# Patient Record
Sex: MALE | Employment: OTHER | URBAN - METROPOLITAN AREA
[De-identification: names, ages, dates, MRNs, and addresses within clinical notes are randomized per-mention and may not be internally consistent; named-entity substitution may affect disease eponyms.]

---

## 2022-01-08 ENCOUNTER — APPOINTMENT (OUTPATIENT)
Dept: CT IMAGING | Facility: CLINIC | Age: 37
End: 2022-01-08
Attending: EMERGENCY MEDICINE
Payer: MEDICAID

## 2022-01-08 ENCOUNTER — HOSPITAL ENCOUNTER (INPATIENT)
Facility: CLINIC | Age: 37
LOS: 3 days | Discharge: HOME | End: 2022-01-11
Attending: EMERGENCY MEDICINE | Admitting: INTERNAL MEDICINE
Payer: MEDICAID

## 2022-01-08 DIAGNOSIS — J90 PLEURAL EFFUSION: ICD-10-CM

## 2022-01-08 DIAGNOSIS — A41.9 SEPSIS, DUE TO UNSPECIFIED ORGANISM, UNSPECIFIED WHETHER ACUTE ORGAN DYSFUNCTION PRESENT (H): ICD-10-CM

## 2022-01-08 DIAGNOSIS — J18.9 PNEUMONIA OF LEFT LOWER LOBE DUE TO INFECTIOUS ORGANISM: ICD-10-CM

## 2022-01-08 DIAGNOSIS — F10.920 ALCOHOL INTOXICATION, UNCOMPLICATED (H): ICD-10-CM

## 2022-01-08 LAB
ALBUMIN SERPL-MCNC: 3.5 G/DL (ref 3.4–5)
ALP SERPL-CCNC: 134 U/L (ref 40–150)
ALT SERPL W P-5'-P-CCNC: 61 U/L (ref 0–70)
ANION GAP SERPL CALCULATED.3IONS-SCNC: 10 MMOL/L (ref 3–14)
AST SERPL W P-5'-P-CCNC: 26 U/L (ref 0–45)
BILIRUB SERPL-MCNC: 0.3 MG/DL (ref 0.2–1.3)
BUN SERPL-MCNC: 15 MG/DL (ref 7–30)
CALCIUM SERPL-MCNC: 8.8 MG/DL (ref 8.5–10.1)
CHLORIDE BLD-SCNC: 105 MMOL/L (ref 94–109)
CO2 SERPL-SCNC: 24 MMOL/L (ref 20–32)
CREAT SERPL-MCNC: 1.11 MG/DL (ref 0.66–1.25)
CRP SERPL-MCNC: 56.7 MG/L (ref 0–8)
ERYTHROCYTE [DISTWIDTH] IN BLOOD BY AUTOMATED COUNT: 11.4 % (ref 10–15)
ERYTHROCYTE [SEDIMENTATION RATE] IN BLOOD BY WESTERGREN METHOD: 46 MM/HR (ref 0–15)
ETHANOL SERPL-MCNC: 0.2 G/DL
GFR SERPL CREATININE-BSD FRML MDRD: 88 ML/MIN/1.73M2
GLUCOSE BLD-MCNC: 154 MG/DL (ref 70–99)
HCT VFR BLD AUTO: 42.8 % (ref 40–53)
HGB BLD-MCNC: 14.4 G/DL (ref 13.3–17.7)
LACTATE SERPL-SCNC: 2.5 MMOL/L (ref 0.7–2)
LIPASE SERPL-CCNC: 84 U/L (ref 73–393)
MAGNESIUM SERPL-MCNC: 2.6 MG/DL (ref 1.6–2.3)
MCH RBC QN AUTO: 32 PG (ref 26.5–33)
MCHC RBC AUTO-ENTMCNC: 33.6 G/DL (ref 31.5–36.5)
MCV RBC AUTO: 95 FL (ref 78–100)
PLATELET # BLD AUTO: 332 10E3/UL (ref 150–450)
POTASSIUM BLD-SCNC: 3.2 MMOL/L (ref 3.4–5.3)
PROCALCITONIN SERPL-MCNC: 0.35 NG/ML
PROT SERPL-MCNC: 8.6 G/DL (ref 6.8–8.8)
RBC # BLD AUTO: 4.5 10E6/UL (ref 4.4–5.9)
SODIUM SERPL-SCNC: 139 MMOL/L (ref 133–144)
TROPONIN I SERPL HS-MCNC: 3 NG/L
WBC # BLD AUTO: 18.1 10E3/UL (ref 4–11)

## 2022-01-08 PROCEDURE — 83690 ASSAY OF LIPASE: CPT | Performed by: EMERGENCY MEDICINE

## 2022-01-08 PROCEDURE — 84145 PROCALCITONIN (PCT): CPT | Performed by: EMERGENCY MEDICINE

## 2022-01-08 PROCEDURE — 120N000001 HC R&B MED SURG/OB

## 2022-01-08 PROCEDURE — 250N000009 HC RX 250: Performed by: EMERGENCY MEDICINE

## 2022-01-08 PROCEDURE — 84484 ASSAY OF TROPONIN QUANT: CPT | Performed by: EMERGENCY MEDICINE

## 2022-01-08 PROCEDURE — 83735 ASSAY OF MAGNESIUM: CPT | Performed by: EMERGENCY MEDICINE

## 2022-01-08 PROCEDURE — C9803 HOPD COVID-19 SPEC COLLECT: HCPCS

## 2022-01-08 PROCEDURE — 250N000011 HC RX IP 250 OP 636: Performed by: EMERGENCY MEDICINE

## 2022-01-08 PROCEDURE — 96365 THER/PROPH/DIAG IV INF INIT: CPT | Mod: 59

## 2022-01-08 PROCEDURE — 74177 CT ABD & PELVIS W/CONTRAST: CPT

## 2022-01-08 PROCEDURE — 85652 RBC SED RATE AUTOMATED: CPT | Performed by: EMERGENCY MEDICINE

## 2022-01-08 PROCEDURE — 250N000013 HC RX MED GY IP 250 OP 250 PS 637: Performed by: EMERGENCY MEDICINE

## 2022-01-08 PROCEDURE — 99291 CRITICAL CARE FIRST HOUR: CPT | Mod: 25

## 2022-01-08 PROCEDURE — 87636 SARSCOV2 & INF A&B AMP PRB: CPT | Performed by: EMERGENCY MEDICINE

## 2022-01-08 PROCEDURE — 87040 BLOOD CULTURE FOR BACTERIA: CPT | Performed by: EMERGENCY MEDICINE

## 2022-01-08 PROCEDURE — 93005 ELECTROCARDIOGRAM TRACING: CPT

## 2022-01-08 PROCEDURE — 96361 HYDRATE IV INFUSION ADD-ON: CPT

## 2022-01-08 PROCEDURE — 96375 TX/PRO/DX INJ NEW DRUG ADDON: CPT

## 2022-01-08 PROCEDURE — 36415 COLL VENOUS BLD VENIPUNCTURE: CPT | Performed by: EMERGENCY MEDICINE

## 2022-01-08 PROCEDURE — 85014 HEMATOCRIT: CPT | Performed by: EMERGENCY MEDICINE

## 2022-01-08 PROCEDURE — 82077 ASSAY SPEC XCP UR&BREATH IA: CPT | Performed by: EMERGENCY MEDICINE

## 2022-01-08 PROCEDURE — 80053 COMPREHEN METABOLIC PANEL: CPT | Performed by: EMERGENCY MEDICINE

## 2022-01-08 PROCEDURE — 83605 ASSAY OF LACTIC ACID: CPT | Performed by: EMERGENCY MEDICINE

## 2022-01-08 PROCEDURE — 258N000003 HC RX IP 258 OP 636: Performed by: EMERGENCY MEDICINE

## 2022-01-08 PROCEDURE — 86140 C-REACTIVE PROTEIN: CPT | Performed by: EMERGENCY MEDICINE

## 2022-01-08 RX ORDER — IOPAMIDOL 755 MG/ML
100 INJECTION, SOLUTION INTRAVASCULAR ONCE
Status: COMPLETED | OUTPATIENT
Start: 2022-01-08 | End: 2022-01-08

## 2022-01-08 RX ORDER — KETOROLAC TROMETHAMINE 15 MG/ML
15 INJECTION, SOLUTION INTRAMUSCULAR; INTRAVENOUS ONCE
Status: COMPLETED | OUTPATIENT
Start: 2022-01-08 | End: 2022-01-08

## 2022-01-08 RX ORDER — SODIUM CHLORIDE 9 MG/ML
INJECTION, SOLUTION INTRAVENOUS CONTINUOUS
Status: DISCONTINUED | OUTPATIENT
Start: 2022-01-08 | End: 2022-01-09

## 2022-01-08 RX ORDER — SODIUM CHLORIDE 9 MG/ML
INJECTION, SOLUTION INTRAVENOUS CONTINUOUS
Status: DISCONTINUED | OUTPATIENT
Start: 2022-01-09 | End: 2022-01-09

## 2022-01-08 RX ORDER — ACETAMINOPHEN 500 MG
1000 TABLET ORAL ONCE
Status: COMPLETED | OUTPATIENT
Start: 2022-01-08 | End: 2022-01-08

## 2022-01-08 RX ORDER — PIPERACILLIN SODIUM, TAZOBACTAM SODIUM 4; .5 G/20ML; G/20ML
4.5 INJECTION, POWDER, LYOPHILIZED, FOR SOLUTION INTRAVENOUS ONCE
Status: COMPLETED | OUTPATIENT
Start: 2022-01-08 | End: 2022-01-08

## 2022-01-08 RX ORDER — ONDANSETRON 2 MG/ML
4 INJECTION INTRAMUSCULAR; INTRAVENOUS EVERY 30 MIN PRN
Status: DISCONTINUED | OUTPATIENT
Start: 2022-01-08 | End: 2022-01-09

## 2022-01-08 RX ADMIN — KETOROLAC TROMETHAMINE 15 MG: 15 INJECTION, SOLUTION INTRAMUSCULAR; INTRAVENOUS at 22:29

## 2022-01-08 RX ADMIN — SODIUM CHLORIDE 69 ML: 900 INJECTION INTRAVENOUS at 22:34

## 2022-01-08 RX ADMIN — IOPAMIDOL 100 ML: 755 INJECTION, SOLUTION INTRAVENOUS at 22:34

## 2022-01-08 RX ADMIN — ONDANSETRON 4 MG: 2 INJECTION INTRAMUSCULAR; INTRAVENOUS at 22:29

## 2022-01-08 RX ADMIN — SODIUM CHLORIDE 1000 ML: 9 INJECTION, SOLUTION INTRAVENOUS at 23:22

## 2022-01-08 RX ADMIN — HYDROMORPHONE HYDROCHLORIDE 1 MG: 1 INJECTION, SOLUTION INTRAMUSCULAR; INTRAVENOUS; SUBCUTANEOUS at 22:29

## 2022-01-08 RX ADMIN — ACETAMINOPHEN 1000 MG: 500 TABLET, FILM COATED ORAL at 22:55

## 2022-01-08 RX ADMIN — SODIUM CHLORIDE 1000 ML: 9 INJECTION, SOLUTION INTRAVENOUS at 22:25

## 2022-01-08 RX ADMIN — PIPERACILLIN SODIUM AND TAZOBACTAM SODIUM 4.5 G: 4; .5 INJECTION, POWDER, LYOPHILIZED, FOR SOLUTION INTRAVENOUS at 23:24

## 2022-01-08 ASSESSMENT — MIFFLIN-ST. JEOR: SCORE: 1673.36

## 2022-01-09 ENCOUNTER — APPOINTMENT (OUTPATIENT)
Dept: CT IMAGING | Facility: CLINIC | Age: 37
End: 2022-01-09
Attending: NURSE PRACTITIONER
Payer: MEDICAID

## 2022-01-09 LAB
ALBUMIN SERPL-MCNC: 3 G/DL (ref 3.4–5)
ALBUMIN SERPL-MCNC: 3 G/DL (ref 3.4–5)
ALBUMIN UR-MCNC: 50 MG/DL
ALP SERPL-CCNC: 122 U/L (ref 40–150)
ALP SERPL-CCNC: 127 U/L (ref 40–150)
ALT SERPL W P-5'-P-CCNC: 46 U/L (ref 0–70)
ALT SERPL W P-5'-P-CCNC: 53 U/L (ref 0–70)
ANION GAP SERPL CALCULATED.3IONS-SCNC: 6 MMOL/L (ref 3–14)
ANION GAP SERPL CALCULATED.3IONS-SCNC: 6 MMOL/L (ref 3–14)
APPEARANCE UR: CLEAR
AST SERPL W P-5'-P-CCNC: 17 U/L (ref 0–45)
AST SERPL W P-5'-P-CCNC: 22 U/L (ref 0–45)
ATRIAL RATE - MUSE: 117 BPM
BASE EXCESS BLDV CALC-SCNC: -3.1 MMOL/L (ref -7.7–1.9)
BILIRUB SERPL-MCNC: 0.4 MG/DL (ref 0.2–1.3)
BILIRUB SERPL-MCNC: 0.8 MG/DL (ref 0.2–1.3)
BILIRUB UR QL STRIP: NEGATIVE
BUN SERPL-MCNC: 13 MG/DL (ref 7–30)
BUN SERPL-MCNC: 13 MG/DL (ref 7–30)
CALCIUM SERPL-MCNC: 8.4 MG/DL (ref 8.5–10.1)
CALCIUM SERPL-MCNC: 8.5 MG/DL (ref 8.5–10.1)
CHLORIDE BLD-SCNC: 102 MMOL/L (ref 94–109)
CHLORIDE BLD-SCNC: 111 MMOL/L (ref 94–109)
CO2 SERPL-SCNC: 21 MMOL/L (ref 20–32)
CO2 SERPL-SCNC: 22 MMOL/L (ref 20–32)
COLOR UR AUTO: YELLOW
CREAT SERPL-MCNC: 0.68 MG/DL (ref 0.66–1.25)
CREAT SERPL-MCNC: 0.76 MG/DL (ref 0.66–1.25)
CRP SERPL-MCNC: 122 MG/L (ref 0–8)
DIASTOLIC BLOOD PRESSURE - MUSE: NORMAL MMHG
ERYTHROCYTE [DISTWIDTH] IN BLOOD BY AUTOMATED COUNT: 11.4 % (ref 10–15)
ERYTHROCYTE [DISTWIDTH] IN BLOOD BY AUTOMATED COUNT: 11.4 % (ref 10–15)
FLUAV RNA SPEC QL NAA+PROBE: NEGATIVE
FLUBV RNA RESP QL NAA+PROBE: NEGATIVE
GFR SERPL CREATININE-BSD FRML MDRD: >90 ML/MIN/1.73M2
GFR SERPL CREATININE-BSD FRML MDRD: >90 ML/MIN/1.73M2
GLUCOSE BLD-MCNC: 147 MG/DL (ref 70–99)
GLUCOSE BLD-MCNC: 217 MG/DL (ref 70–99)
GLUCOSE UR STRIP-MCNC: >=1000 MG/DL
HCO3 BLDV-SCNC: 24 MMOL/L (ref 21–28)
HCT VFR BLD AUTO: 37.2 % (ref 40–53)
HCT VFR BLD AUTO: 37.9 % (ref 40–53)
HGB BLD-MCNC: 12.5 G/DL (ref 13.3–17.7)
HGB BLD-MCNC: 13.2 G/DL (ref 13.3–17.7)
HGB UR QL STRIP: NEGATIVE
INTERPRETATION ECG - MUSE: NORMAL
KETONES UR STRIP-MCNC: 40 MG/DL
LACTATE SERPL-SCNC: 1 MMOL/L (ref 0.7–2)
LACTATE SERPL-SCNC: 1.1 MMOL/L (ref 0.7–2)
LACTATE SERPL-SCNC: 1.9 MMOL/L (ref 0.7–2)
LEUKOCYTE ESTERASE UR QL STRIP: NEGATIVE
LIPASE SERPL-CCNC: 59 U/L (ref 73–393)
MAGNESIUM SERPL-MCNC: 2.3 MG/DL (ref 1.6–2.3)
MCH RBC QN AUTO: 32.1 PG (ref 26.5–33)
MCH RBC QN AUTO: 32.2 PG (ref 26.5–33)
MCHC RBC AUTO-ENTMCNC: 33.6 G/DL (ref 31.5–36.5)
MCHC RBC AUTO-ENTMCNC: 34.8 G/DL (ref 31.5–36.5)
MCV RBC AUTO: 92 FL (ref 78–100)
MCV RBC AUTO: 95 FL (ref 78–100)
MUCOUS THREADS #/AREA URNS LPF: PRESENT /LPF
NITRATE UR QL: NEGATIVE
O2/TOTAL GAS SETTING VFR VENT: 20 %
P AXIS - MUSE: 30 DEGREES
PCO2 BLDV: 48 MM HG (ref 40–50)
PH BLDV: 7.3 [PH] (ref 7.32–7.43)
PH UR STRIP: 6 [PH] (ref 5–7)
PLATELET # BLD AUTO: 231 10E3/UL (ref 150–450)
PLATELET # BLD AUTO: 238 10E3/UL (ref 150–450)
PO2 BLDV: 74 MM HG (ref 25–47)
POTASSIUM BLD-SCNC: 3.7 MMOL/L (ref 3.4–5.3)
POTASSIUM BLD-SCNC: 3.9 MMOL/L (ref 3.4–5.3)
POTASSIUM BLD-SCNC: 3.9 MMOL/L (ref 3.4–5.3)
PR INTERVAL - MUSE: 162 MS
PROCALCITONIN SERPL-MCNC: 0.85 NG/ML
PROT SERPL-MCNC: 7.7 G/DL (ref 6.8–8.8)
PROT SERPL-MCNC: 7.8 G/DL (ref 6.8–8.8)
QRS DURATION - MUSE: 86 MS
QT - MUSE: 318 MS
QTC - MUSE: 443 MS
R AXIS - MUSE: 55 DEGREES
RBC # BLD AUTO: 3.9 10E6/UL (ref 4.4–5.9)
RBC # BLD AUTO: 4.1 10E6/UL (ref 4.4–5.9)
RBC URINE: 0 /HPF
SARS-COV-2 RNA RESP QL NAA+PROBE: NEGATIVE
SODIUM SERPL-SCNC: 130 MMOL/L (ref 133–144)
SODIUM SERPL-SCNC: 138 MMOL/L (ref 133–144)
SP GR UR STRIP: 1.02 (ref 1–1.03)
SYSTOLIC BLOOD PRESSURE - MUSE: NORMAL MMHG
T AXIS - MUSE: -7 DEGREES
TROPONIN I SERPL HS-MCNC: 36 NG/L
UROBILINOGEN UR STRIP-MCNC: 2 MG/DL
VENTRICULAR RATE- MUSE: 117 BPM
WBC # BLD AUTO: 10.5 10E3/UL (ref 4–11)
WBC # BLD AUTO: 12.7 10E3/UL (ref 4–11)
WBC URINE: 2 /HPF

## 2022-01-09 PROCEDURE — 81001 URINALYSIS AUTO W/SCOPE: CPT | Performed by: NURSE PRACTITIONER

## 2022-01-09 PROCEDURE — 36415 COLL VENOUS BLD VENIPUNCTURE: CPT | Performed by: EMERGENCY MEDICINE

## 2022-01-09 PROCEDURE — 250N000011 HC RX IP 250 OP 636

## 2022-01-09 PROCEDURE — 84145 PROCALCITONIN (PCT): CPT | Performed by: NURSE PRACTITIONER

## 2022-01-09 PROCEDURE — 99222 1ST HOSP IP/OBS MODERATE 55: CPT | Mod: AI | Performed by: INTERNAL MEDICINE

## 2022-01-09 PROCEDURE — 250N000011 HC RX IP 250 OP 636: Performed by: INTERNAL MEDICINE

## 2022-01-09 PROCEDURE — 82803 BLOOD GASES ANY COMBINATION: CPT | Performed by: NURSE PRACTITIONER

## 2022-01-09 PROCEDURE — 87040 BLOOD CULTURE FOR BACTERIA: CPT | Performed by: NURSE PRACTITIONER

## 2022-01-09 PROCEDURE — 83605 ASSAY OF LACTIC ACID: CPT | Performed by: INTERNAL MEDICINE

## 2022-01-09 PROCEDURE — 84155 ASSAY OF PROTEIN SERUM: CPT | Performed by: NURSE PRACTITIONER

## 2022-01-09 PROCEDURE — 83735 ASSAY OF MAGNESIUM: CPT | Performed by: INTERNAL MEDICINE

## 2022-01-09 PROCEDURE — 258N000003 HC RX IP 258 OP 636: Performed by: NURSE PRACTITIONER

## 2022-01-09 PROCEDURE — 85027 COMPLETE CBC AUTOMATED: CPT | Performed by: INTERNAL MEDICINE

## 2022-01-09 PROCEDURE — 250N000009 HC RX 250: Performed by: INTERNAL MEDICINE

## 2022-01-09 PROCEDURE — 74177 CT ABD & PELVIS W/CONTRAST: CPT

## 2022-01-09 PROCEDURE — 86140 C-REACTIVE PROTEIN: CPT | Performed by: NURSE PRACTITIONER

## 2022-01-09 PROCEDURE — 36415 COLL VENOUS BLD VENIPUNCTURE: CPT | Performed by: INTERNAL MEDICINE

## 2022-01-09 PROCEDURE — 250N000011 HC RX IP 250 OP 636: Performed by: NURSE PRACTITIONER

## 2022-01-09 PROCEDURE — 120N000004 HC R&B MS OVERFLOW

## 2022-01-09 PROCEDURE — 93005 ELECTROCARDIOGRAM TRACING: CPT

## 2022-01-09 PROCEDURE — 36415 COLL VENOUS BLD VENIPUNCTURE: CPT | Performed by: NURSE PRACTITIONER

## 2022-01-09 PROCEDURE — 85027 COMPLETE CBC AUTOMATED: CPT | Performed by: NURSE PRACTITIONER

## 2022-01-09 PROCEDURE — 83605 ASSAY OF LACTIC ACID: CPT | Performed by: NURSE PRACTITIONER

## 2022-01-09 PROCEDURE — 84450 TRANSFERASE (AST) (SGOT): CPT | Performed by: NURSE PRACTITIONER

## 2022-01-09 PROCEDURE — 83690 ASSAY OF LIPASE: CPT | Performed by: NURSE PRACTITIONER

## 2022-01-09 PROCEDURE — 84132 ASSAY OF SERUM POTASSIUM: CPT | Performed by: INTERNAL MEDICINE

## 2022-01-09 PROCEDURE — 83605 ASSAY OF LACTIC ACID: CPT | Performed by: EMERGENCY MEDICINE

## 2022-01-09 PROCEDURE — 250N000013 HC RX MED GY IP 250 OP 250 PS 637: Performed by: INTERNAL MEDICINE

## 2022-01-09 PROCEDURE — 84484 ASSAY OF TROPONIN QUANT: CPT | Performed by: NURSE PRACTITIONER

## 2022-01-09 RX ORDER — OXYCODONE HYDROCHLORIDE 5 MG/1
5 TABLET ORAL EVERY 4 HOURS PRN
Status: DISCONTINUED | OUTPATIENT
Start: 2022-01-09 | End: 2022-01-11 | Stop reason: HOSPADM

## 2022-01-09 RX ORDER — ONDANSETRON 2 MG/ML
4 INJECTION INTRAMUSCULAR; INTRAVENOUS EVERY 6 HOURS PRN
Status: DISCONTINUED | OUTPATIENT
Start: 2022-01-09 | End: 2022-01-11 | Stop reason: HOSPADM

## 2022-01-09 RX ORDER — ACETAMINOPHEN 325 MG/1
325-650 TABLET ORAL EVERY 6 HOURS PRN
COMMUNITY

## 2022-01-09 RX ORDER — LANOLIN ALCOHOL/MO/W.PET/CERES
3 CREAM (GRAM) TOPICAL
Status: DISCONTINUED | OUTPATIENT
Start: 2022-01-09 | End: 2022-01-11 | Stop reason: HOSPADM

## 2022-01-09 RX ORDER — AMOXICILLIN 250 MG
2 CAPSULE ORAL 2 TIMES DAILY PRN
Status: DISCONTINUED | OUTPATIENT
Start: 2022-01-09 | End: 2022-01-11 | Stop reason: HOSPADM

## 2022-01-09 RX ORDER — ACETAMINOPHEN 650 MG/1
650 SUPPOSITORY RECTAL EVERY 6 HOURS PRN
Status: DISCONTINUED | OUTPATIENT
Start: 2022-01-09 | End: 2022-01-11 | Stop reason: HOSPADM

## 2022-01-09 RX ORDER — SODIUM CHLORIDE 9 MG/ML
INJECTION, SOLUTION INTRAVENOUS CONTINUOUS
Status: DISCONTINUED | OUTPATIENT
Start: 2022-01-09 | End: 2022-01-11 | Stop reason: HOSPADM

## 2022-01-09 RX ORDER — NALOXONE HYDROCHLORIDE 0.4 MG/ML
0.4 INJECTION, SOLUTION INTRAMUSCULAR; INTRAVENOUS; SUBCUTANEOUS
Status: DISCONTINUED | OUTPATIENT
Start: 2022-01-09 | End: 2022-01-11 | Stop reason: HOSPADM

## 2022-01-09 RX ORDER — LORAZEPAM 2 MG/ML
INJECTION INTRAMUSCULAR
Status: COMPLETED
Start: 2022-01-09 | End: 2022-01-09

## 2022-01-09 RX ORDER — CEFTRIAXONE 2 G/1
2 INJECTION, POWDER, FOR SOLUTION INTRAMUSCULAR; INTRAVENOUS EVERY 24 HOURS
Status: DISCONTINUED | OUTPATIENT
Start: 2022-01-09 | End: 2022-01-10

## 2022-01-09 RX ORDER — AMOXICILLIN 250 MG
1 CAPSULE ORAL 2 TIMES DAILY PRN
Status: DISCONTINUED | OUTPATIENT
Start: 2022-01-09 | End: 2022-01-11 | Stop reason: HOSPADM

## 2022-01-09 RX ORDER — HYDROMORPHONE HYDROCHLORIDE 1 MG/ML
.3-.5 INJECTION, SOLUTION INTRAMUSCULAR; INTRAVENOUS; SUBCUTANEOUS
Status: DISCONTINUED | OUTPATIENT
Start: 2022-01-09 | End: 2022-01-11 | Stop reason: HOSPADM

## 2022-01-09 RX ORDER — PROCHLORPERAZINE MALEATE 5 MG
10 TABLET ORAL EVERY 6 HOURS PRN
Status: DISCONTINUED | OUTPATIENT
Start: 2022-01-09 | End: 2022-01-11 | Stop reason: HOSPADM

## 2022-01-09 RX ORDER — NALOXONE HYDROCHLORIDE 0.4 MG/ML
0.2 INJECTION, SOLUTION INTRAMUSCULAR; INTRAVENOUS; SUBCUTANEOUS
Status: DISCONTINUED | OUTPATIENT
Start: 2022-01-09 | End: 2022-01-11 | Stop reason: HOSPADM

## 2022-01-09 RX ORDER — ACETAMINOPHEN 325 MG/1
650 TABLET ORAL EVERY 6 HOURS PRN
Status: DISCONTINUED | OUTPATIENT
Start: 2022-01-09 | End: 2022-01-11 | Stop reason: HOSPADM

## 2022-01-09 RX ORDER — HYDROMORPHONE HYDROCHLORIDE 1 MG/ML
INJECTION, SOLUTION INTRAMUSCULAR; INTRAVENOUS; SUBCUTANEOUS
Status: COMPLETED
Start: 2022-01-09 | End: 2022-01-09

## 2022-01-09 RX ORDER — POTASSIUM CHLORIDE 1500 MG/1
40 TABLET, EXTENDED RELEASE ORAL ONCE
Status: COMPLETED | OUTPATIENT
Start: 2022-01-09 | End: 2022-01-09

## 2022-01-09 RX ORDER — PIPERACILLIN SODIUM, TAZOBACTAM SODIUM 3; .375 G/15ML; G/15ML
3.38 INJECTION, POWDER, LYOPHILIZED, FOR SOLUTION INTRAVENOUS EVERY 6 HOURS
Status: DISCONTINUED | OUTPATIENT
Start: 2022-01-09 | End: 2022-01-11 | Stop reason: HOSPADM

## 2022-01-09 RX ORDER — PROCHLORPERAZINE 25 MG
25 SUPPOSITORY, RECTAL RECTAL EVERY 12 HOURS PRN
Status: DISCONTINUED | OUTPATIENT
Start: 2022-01-09 | End: 2022-01-11 | Stop reason: HOSPADM

## 2022-01-09 RX ORDER — IOPAMIDOL 755 MG/ML
95 INJECTION, SOLUTION INTRAVASCULAR ONCE
Status: COMPLETED | OUTPATIENT
Start: 2022-01-09 | End: 2022-01-09

## 2022-01-09 RX ORDER — LIDOCAINE 40 MG/G
CREAM TOPICAL
Status: DISCONTINUED | OUTPATIENT
Start: 2022-01-09 | End: 2022-01-11 | Stop reason: HOSPADM

## 2022-01-09 RX ORDER — IBUPROFEN 600 MG/1
600 TABLET, FILM COATED ORAL EVERY 6 HOURS PRN
Status: DISCONTINUED | OUTPATIENT
Start: 2022-01-09 | End: 2022-01-11 | Stop reason: HOSPADM

## 2022-01-09 RX ORDER — GUAIFENESIN/DEXTROMETHORPHAN 100-10MG/5
10 SYRUP ORAL EVERY 4 HOURS PRN
Status: DISCONTINUED | OUTPATIENT
Start: 2022-01-09 | End: 2022-01-11 | Stop reason: HOSPADM

## 2022-01-09 RX ORDER — AZITHROMYCIN 500 MG/1
500 INJECTION, POWDER, LYOPHILIZED, FOR SOLUTION INTRAVENOUS EVERY 24 HOURS
Status: COMPLETED | OUTPATIENT
Start: 2022-01-09 | End: 2022-01-09

## 2022-01-09 RX ORDER — ONDANSETRON 4 MG/1
4 TABLET, ORALLY DISINTEGRATING ORAL EVERY 6 HOURS PRN
Status: DISCONTINUED | OUTPATIENT
Start: 2022-01-09 | End: 2022-01-11 | Stop reason: HOSPADM

## 2022-01-09 RX ADMIN — CEFTRIAXONE SODIUM 2 G: 2 INJECTION, POWDER, FOR SOLUTION INTRAMUSCULAR; INTRAVENOUS at 03:52

## 2022-01-09 RX ADMIN — HYDROMORPHONE HYDROCHLORIDE 1 MG: 1 INJECTION, SOLUTION INTRAMUSCULAR; INTRAVENOUS; SUBCUTANEOUS at 16:02

## 2022-01-09 RX ADMIN — POTASSIUM CHLORIDE 40 MEQ: 1500 TABLET, EXTENDED RELEASE ORAL at 03:52

## 2022-01-09 RX ADMIN — AZITHROMYCIN MONOHYDRATE 500 MG: 500 INJECTION, POWDER, LYOPHILIZED, FOR SOLUTION INTRAVENOUS at 02:44

## 2022-01-09 RX ADMIN — OXYCODONE HYDROCHLORIDE 5 MG: 5 TABLET ORAL at 12:58

## 2022-01-09 RX ADMIN — HYDROMORPHONE HYDROCHLORIDE 2 MG: 1 INJECTION, SOLUTION INTRAMUSCULAR; INTRAVENOUS; SUBCUTANEOUS at 16:05

## 2022-01-09 RX ADMIN — SODIUM CHLORIDE: 9 INJECTION, SOLUTION INTRAVENOUS at 17:51

## 2022-01-09 RX ADMIN — IOPAMIDOL 95 ML: 755 INJECTION, SOLUTION INTRAVENOUS at 16:26

## 2022-01-09 RX ADMIN — LORAZEPAM 2 MG: 2 INJECTION INTRAMUSCULAR; INTRAVENOUS at 16:06

## 2022-01-09 RX ADMIN — IBUPROFEN 600 MG: 600 TABLET ORAL at 15:49

## 2022-01-09 RX ADMIN — PIPERACILLIN SODIUM AND TAZOBACTAM SODIUM 3.38 G: 3; .375 INJECTION, POWDER, LYOPHILIZED, FOR SOLUTION INTRAVENOUS at 17:52

## 2022-01-09 RX ADMIN — HYDROMORPHONE HYDROCHLORIDE 0.5 MG: 1 INJECTION, SOLUTION INTRAMUSCULAR; INTRAVENOUS; SUBCUTANEOUS at 10:13

## 2022-01-09 RX ADMIN — ACETAMINOPHEN 650 MG: 325 TABLET, FILM COATED ORAL at 06:21

## 2022-01-09 RX ADMIN — PIPERACILLIN SODIUM AND TAZOBACTAM SODIUM 3.38 G: 3; .375 INJECTION, POWDER, LYOPHILIZED, FOR SOLUTION INTRAVENOUS at 23:50

## 2022-01-09 RX ADMIN — HYDROMORPHONE HYDROCHLORIDE 0.5 MG: 1 INJECTION, SOLUTION INTRAMUSCULAR; INTRAVENOUS; SUBCUTANEOUS at 15:32

## 2022-01-09 RX ADMIN — SODIUM CHLORIDE 69 ML: 9 INJECTION, SOLUTION INTRAVENOUS at 16:28

## 2022-01-09 ASSESSMENT — ACTIVITIES OF DAILY LIVING (ADL)
ADLS_ACUITY_SCORE: 3
ADLS_ACUITY_SCORE: 7
ADLS_ACUITY_SCORE: 3
ADLS_ACUITY_SCORE: 7
ADLS_ACUITY_SCORE: 3

## 2022-01-09 ASSESSMENT — MIFFLIN-ST. JEOR: SCORE: 1686.06

## 2022-01-09 NOTE — ED NOTES
[facility]  ED Nurse Handoff Report    ED Chief complaint: Flank Pain and Nausea & Vomiting      ED Diagnosis:   Final diagnoses:   None       Code Status: Full Code    Allergies: No Known Allergies    Patient Story: Sudden onset of severe mid back pain at 20:30  Focused Assessment:  Alert and oriented x4. Hypoxic in room air, in 2L of O2. Independent at baseline. [place] citizen speaking only. 18G in R AC, 20G in L lower arm. Sinus tachycardic. No trauma noted or reported.     Labs Ordered and Resulted from Time of ED Arrival to Time of ED Departure   COMPREHENSIVE METABOLIC PANEL - Abnormal       Result Value    Sodium 139      Potassium 3.2 (*)     Chloride 105      Carbon Dioxide (CO2) 24      Anion Gap 10      Urea Nitrogen 15      Creatinine 1.11      Calcium 8.8      Glucose 154 (*)     Alkaline Phosphatase 134      AST 26      ALT 61      Protein Total 8.6      Albumin 3.5      Bilirubin Total 0.3      GFR Estimate 88     ETHYL ALCOHOL LEVEL - Abnormal    Alcohol ethyl 0.20 (*)    CBC WITH PLATELETS AND DIFFERENTIAL - Abnormal    WBC Count 18.1 (*)     RBC Count 4.50      Hemoglobin 14.4      Hematocrit 42.8      MCV 95      MCH 32.0      MCHC 33.6      RDW 11.4      Platelet Count 332     MAGNESIUM - Abnormal    Magnesium 2.6 (*)    CRP INFLAMMATION - Abnormal    CRP Inflammation 56.7 (*)    ERYTHROCYTE SEDIMENTATION RATE AUTO - Abnormal    Erythrocyte Sedimentation Rate 46 (*)    DIFFERENTIAL - Abnormal    % Neutrophils 45      % Lymphocytes 49      % Monocytes 4      % Eosinophils 2      % Basophils 0      Absolute Neutrophils 8.1      Absolute Lymphocytes 8.9 (*)     Absolute Monocytes 0.7      Absolute Eosinophils 0.4      Absolute Basophils 0.0      RBC Morphology Confirmed RBC Indices      Platelet Assessment        Value: Automated Count Confirmed. Platelet morphology is normal.    Reactive Lymphocytes Present (*)     Smudge Cells Present (*)     Pathologist Review Comments       LIPASE -  Normal    Lipase 84     TROPONIN I - Normal    Troponin I High Sensitivity 3     LACTIC ACID WHOLE BLOOD   ROUTINE UA WITH MICROSCOPIC REFLEX TO CULTURE   INFLUENZA A/B & SARS-COV2 PCR MULTIPLEX   PROCALCITONIN   BLOOD CULTURE   BLOOD CULTURE     CT Chest (PE) Abdomen Pelvis w Contrast   Preliminary Result   IMPRESSION:   1.  Left lower lobe and lingular pneumonia. There is a 2.6 cm ovoid nodule or nodular infiltrate in the left lower lobe. Follow-up recommended.   2.  Mildly enlarged mediastinal and left hilar lymph nodes.   3.  Small left pleural effusion.   4.  Fatty infiltration of the liver.   5.  Large amount of food debris in the stomach. No evidence of outlet obstruction.            Treatments and/or interventions provided: NS, pain meds  Patient's response to treatments and/or interventions: Tolerated well    To be done/followed up on inpatient unit:  Continue with plan of care per admitting MD.      Does this patient have any cognitive concerns?: N/A    Activity level - Baseline/Home:  Independent  Activity Level - Current:   Independent    Patient's Preferred language: Lao   Needed?: No    Isolation: None and COVID r/o and special precautions  Infection: Not Applicable  COVID r/o and special precautions  Patient tested for COVID 19 prior to admission: YES  Bariatric?: No    Vital Signs:   Vitals:    01/08/22 2250 01/08/22 2255 01/08/22 2300 01/08/22 2305   BP:   91/49    Pulse: (!) 121 117 116 117   Resp: 10 11 17 17   Temp:       TempSrc:       SpO2: 90% 96% 97% 97%   Weight:       Height:           Cardiac Rhythm:     Was the PSS-3 completed:   Yes  What interventions are required if any?               Family Comments: Wife came in with patient  OBS brochure/video discussed/provided to patient/family: N/A               For the majority of the shift this patient's behavior was Green.     ED NURSE PHONE NUMBER: [phone]

## 2022-01-09 NOTE — PHARMACY-ADMISSION MEDICATION HISTORY
Pharmacy Medication History  Admission medication history interview status for the 1/8/2022  admission is complete. See EPIC admission navigator for prior to admission medications     Location of Interview: Phone  Medication history sources: Patient    Significant changes made to the medication list:  Added entire list    In the past week, patient estimated taking medication this percent of the time: greater than 90%    Additional medication history information:   Pt doesn't know name of the pharmacy or the clinic, other than it is in [place]. He doesn't have his medications here and his wife doesn't speak English. He was able to tell me the dose of metformin (1000mg bid) but doesn't know the name of the cream that he uses but thinks it is some sort of corticosteroid. There are no records in Surescripts or Care Everywhere.    Medication reconciliation completed by provider prior to medication history? No    Time spent in this activity: 15 minutes    Prior to Admission medications    Medication Sig Last Dose Taking? Auth Provider   acetaminophen (TYLENOL) 325 MG tablet Take 325-650 mg by mouth every 6 hours as needed for mild pain 1/8/2022 at Unknown time Yes Unknown, Entered By History   metFORMIN (GLUCOPHAGE) 1000 MG tablet Take 1,000 mg by mouth 2 times daily (with meals) 1/8/2022 at Unknown time Yes Unknown, Entered By History   UNKNOWN TO PATIENT Apply topically 2 times daily To whole body except face 1/8/2022 at Unknown time Yes Unknown, Entered By History       The information provided in this note is only as accurate as the sources available at the time of update(s)

## 2022-01-09 NOTE — PROGRESS NOTES
Patient admitted today morning by my colleague for pneumonia and multiple other medical problems, he is currently resting in room air, has pleuritic chest pain, will continue IV antibiotics today, will transition to p.o. and possibly discharge tomorrow if he continues to remain in room air.  I visited with the patient and spouse and explained plan of care

## 2022-01-09 NOTE — CODE/RAPID RESPONSE
RRT Note:  RRT called for severe pain. Upon my arrival patient pacing room, yelling, diaphoretic, endorsing very severe LUQ and left flank pain. He notes the pain comes and goes. He shares with me he does not routinely abuse alcohol but drank lots of alcohol yesterday in an effort to help the pain. CT PE study on 1/8/2022 with LLL pneumonia and lingular pneumonia, 2.6 cm ovoid nodule or nodular infiltration in the LLL. Fever 101.9.     Plan:  - given severity of pain repeat imaging   - repeat labs  - dilaudid 3- mg IV x 1, lorazepam 1- mg   - EKG     ADDENDUM: Note above indicates dilaudid 3- mg IV and lorazepam 1-mg IV were given during RRT on 1/9/2022; the correct amount is dilaudid 3- mg IV and lorazepam 2- mg IV were given during RRT on 1/9/2022.     CHRISTIANO Addison, CNP  Hospitalist Service, House Officer  [facility]     Text Page  Pager: [phone]

## 2022-01-09 NOTE — ED TRIAGE NOTES
"[facility]  ED Arrival Note    Arrives through triage. ABC's intact. A &O X4. . Pt arrives with c/o R mid back pain since 8:30 pm. Patient is screaming in pain, unable to lay still, and reporting \"I'm going to die.\" Noted to be hypoxic and tachycardic. Requires       Visitors during triage: Unknown      Triage Interventions: Direct rooming     Ambulatory: Yes    Meets Stroke Criteria?: No    Meets Trauma Criteria?: No        Directed to: Main ED    Pronouns: he/him    "

## 2022-01-09 NOTE — ED PROVIDER NOTES
"  History     Chief Complaint:  Left flank pain    HPI   Of note, a English speaking nurse was used to interpret per the patient's request.  Trevor Johnson is a 36 year old male who presents with left flank pain.  He states this started about 8 PM tonight, just a few hours prior to arrival.  He also complains of feeling short of breath.  He has some pain in the left side of his abdomen as well, but he denies having a cough or any urinary symptoms.  He also denies any chest pain.  He states that he had similar symptoms about a week ago that were present for about a day but not as severe.  He did not get evaluated at that time.    ROS:  Review of Systems   All other systems reviewed and are negative.         Allergies:  Allergies have not been reviewed     Medications:    No current outpatient medications on file.      Past Medical History:    No past medical history on file.  There is no problem list on file for this patient.       Past Surgical History:    No past surgical history on file.     Family History:    family history is not on file.    Social History:   Drinks alcohol.  PCP: No primary care provider on file.     Physical Exam     Patient Vitals for the past 24 hrs:   BP Temp Temp src Pulse Resp SpO2 Height Weight   01/08/22 2305 -- -- -- 117 17 97 % -- --   01/08/22 2300 91/49 -- -- 116 17 97 % -- --   01/08/22 2255 -- -- -- 117 11 96 % -- --   01/08/22 2250 -- -- -- (!) 121 10 90 % -- --   01/08/22 2247 -- (!) 101.5  F (38.6  C) Temporal -- -- -- 1.6 m (5' 3\") 84.8 kg (187 lb)   01/08/22 2226 -- -- -- -- 27 90 % -- --   01/08/22 2222 -- -- -- -- -- (!) 89 % -- --   01/08/22 2221 -- -- -- -- -- (!) 89 % -- --   01/08/22 2219 -- -- -- -- -- (!) 87 % -- --   01/08/22 2218 -- -- -- -- -- (!) 87 % -- --   01/08/22 6543 (!) 128/102 -- -- (!) 131 -- (!) 89 % -- --        Physical Exam  Nursing note and vitals reviewed.  Constitutional:  Oriented to person, place, and time. Cooperative.  Appears very " uncomfortable and smells of alcohol.  HENT:   Nose:    Nose normal.   Mouth/Throat:   Facemask in place.  Eyes:    Conjunctivae normal and EOM are normal.      Pupils are equal, round, and reactive to light.   Neck:    Trachea normal.   Cardiovascular:  Tachycardic rate, regular rhythm, normal heart sounds and normal pulses. No murmur heard.  Pulmonary/Chest:  Tachypneic with diminished breath sounds in the left base as well as some rales in the left base.   Abdominal:   Soft. Normal appearance and bowel sounds are normal.      There is diffuse tenderness to palpation with left CVA tenderness to palpation.      There is no rebound.   Musculoskeletal:  Extremities atraumatic x 4.   Lymphadenopathy:  No cervical adenopathy.   Neurological:   Alert and oriented to person, place, and time. Normal strength.      No cranial nerve deficit or sensory deficit. GCS eye subscore is 4. GCS verbal subscore is 5. GCS motor subscore is 6.   Skin:    Skin is intact. No rash noted.   Psychiatric:   Normal mood and affect.      Emergency Department Course   ECG:  ECG  ECG taken at 2257, ECG read at 2300  Rate 117 bpm. NE interval 162 ms. QRS duration 86 ms. QT/QTc 318/443 ms. P-R-T axes 30 55 -7.       Imaging:  CT Chest (PE) Abdomen Pelvis w Contrast   Final Result   IMPRESSION:   1.  Left lower lobe and lingular pneumonia. There is a 2.6 cm ovoid nodule or nodular infiltrate in the left lower lobe. Follow-up recommended.   2.  Mildly enlarged mediastinal and left hilar lymph nodes.   3.  Small left pleural effusion.   4.  Fatty infiltration of the liver.   5.  Large amount of food debris in the stomach. No evidence of outlet obstruction.         Report per radiology    Laboratory:  Labs Ordered and Resulted from Time of ED Arrival to Time of ED Departure   COMPREHENSIVE METABOLIC PANEL - Abnormal       Result Value    Sodium 139      Potassium 3.2 (*)     Chloride 105      Carbon Dioxide (CO2) 24      Anion Gap 10      Urea  Nitrogen 15      Creatinine 1.11      Calcium 8.8      Glucose 154 (*)     Alkaline Phosphatase 134      AST 26      ALT 61      Protein Total 8.6      Albumin 3.5      Bilirubin Total 0.3      GFR Estimate 88     ETHYL ALCOHOL LEVEL - Abnormal    Alcohol ethyl 0.20 (*)    CBC WITH PLATELETS AND DIFFERENTIAL - Abnormal    WBC Count 18.1 (*)     RBC Count 4.50      Hemoglobin 14.4      Hematocrit 42.8      MCV 95      MCH 32.0      MCHC 33.6      RDW 11.4      Platelet Count 332     LACTIC ACID WHOLE BLOOD - Abnormal    Lactic Acid 2.5 (*)    MAGNESIUM - Abnormal    Magnesium 2.6 (*)    CRP INFLAMMATION - Abnormal    CRP Inflammation 56.7 (*)    ERYTHROCYTE SEDIMENTATION RATE AUTO - Abnormal    Erythrocyte Sedimentation Rate 46 (*)    PROCALCITONIN - Abnormal    Procalcitonin 0.35 (*)    DIFFERENTIAL - Abnormal    % Neutrophils 45      % Lymphocytes 49      % Monocytes 4      % Eosinophils 2      % Basophils 0      Absolute Neutrophils 8.1      Absolute Lymphocytes 8.9 (*)     Absolute Monocytes 0.7      Absolute Eosinophils 0.4      Absolute Basophils 0.0      RBC Morphology Confirmed RBC Indices      Platelet Assessment        Value: Automated Count Confirmed. Platelet morphology is normal.    Reactive Lymphocytes Present (*)     Smudge Cells Present (*)     Pathologist Review Comments       LIPASE - Normal    Lipase 84     TROPONIN I - Normal    Troponin I High Sensitivity 3     INFLUENZA A/B & SARS-COV2 PCR MULTIPLEX - Normal    Influenza A PCR Negative      Influenza B PCR Negative      SARS CoV2 PCR Negative     ROUTINE UA WITH MICROSCOPIC REFLEX TO CULTURE   BLOOD CULTURE   BLOOD CULTURE        Emergency Department Course:  Reviewed:  I reviewed nursing notes, vitals, past medical history, Care Everywhere and [facility]      Interventions:  Medications   0.9% sodium chloride BOLUS (0 mLs Intravenous Stopped 1/8/22 4713)     Followed by   sodium chloride 0.9% infusion (has no administration in time range)  "  ondansetron (ZOFRAN) injection 4 mg (4 mg Intravenous Given 1/8/22 2229)   0.9% sodium chloride BOLUS (0 mLs Intravenous Stopped 1/9/22 0001)     Followed by   sodium chloride 0.9% infusion (has no administration in time range)   ketorolac (TORADOL) injection 15 mg (15 mg Intravenous Given 1/8/22 2229)   HYDROmorphone (DILAUDID) injection 1 mg (1 mg Intravenous Given 1/8/22 2229)   Saline (69 mLs As instructed Given 1/8/22 2234)   iopamidol (ISOVUE-370) solution 100 mL (100 mLs Intravenous Given 1/8/22 2234)   acetaminophen (TYLENOL) tablet 1,000 mg (1,000 mg Oral Given 1/8/22 2255)   piperacillin-tazobactam (ZOSYN) 4.5 g vial to attach to  mL bag (0 g Intravenous Stopped 1/8/22 2354)        Disposition:  The patient was admitted to the hospital under the care of Dr. Reza.     Impression & Plan    CMS Diagnoses:   The patient has signs of Severe Sepsis    The patient has signs of Severe Sepsis as evidenced by:    1. 2 SIRS criteria, AND  2. Suspected infection, AND   3. Organ dysfunction: Lactic Acidosis with value >2.0    Time severe sepsis diagnosis confirmed: 2300 01/08/22 as this was the time when Lactate resulted, and the level was > 2.0    3 Hour Severe Sepsis Bundle Completion:    1. Initial Lactic Acid Result:   Recent Labs   Lab Test 01/08/22  2257   LACT 2.5*     2. Blood Cultures before Antibiotics: Yes  3. Broad Spectrum Antibiotics Administered:  yes       Anti-infectives (From admission through now)    Start     Dose/Rate Route Frequency Ordered Stop    01/08/22 2320  piperacillin-tazobactam (ZOSYN) 4.5 g vial to attach to  mL bag        Note to Pharmacy: For [facility], [facility] and [facility]: For Zosyn-naive patients, use the \"Zosyn initial dose + extended infusion\" order panel.    4.5 g  over 30 Minutes Intravenous ONCE 01/08/22 2318 01/08/22 2354          4. Fluid volume administered in ED:  Full 30 mL/kg bolus given (see amount below).    BMI Readings from Last 1 Encounters:   01/08/22 33.13 kg/m  "     30 mL/kg fluids based on weight: 2,540 mL  30 mL/kg fluids based on IBW (must be >= 60 inches tall): 1,710 mL                Severe Sepsis reassessment:  1. Repeat Lactic Acid Level: pending    I attest to having performed a repeat sepsis exam and assessment of perfusion at 2320 and the results demonstrate improved perfusion.     Covid-19  Trevor Johnson was evaluated during a global COVID-19 pandemic, which necessitated consideration that the patient might be at risk for infection with the SARS-CoV-2 virus that causes COVID-19.   Applicable protocols for evaluation were followed during the patient's care.   COVID-19 was considered as part of the patient's evaluation. The plan for testing is:  a test was obtained during this visit.     Medical Decision Making:  This is a 36-year-old male who came in for further evaluation of acute onset of left flank pain as well as shortness of breath.  Based on his presentation, I was initially suspicious he would have a kidney stone.  However he was also hypoxic, which made me concerned about the possibility of pulmonary embolism, pneumonia, or possibly a pneumothorax.  I also considered the possibility of a kidney infection or sepsis.  I proceeded with the above work-up including the blood work and CT scan.  He was provided the above interventions as well with significant improvement of his symptoms.  He did require oxygen by nasal cannula to get his oxygen saturation levels up as well.  It appears that he is septic from pneumonia.  He was provided IV fluids as well as IV Zosyn.  His Covid swab is negative.  He also is intoxicated, and I suspect that he may be an alcoholic.  Regardless, he clearly needs to be admitted to the hospital for further evaluation and management.  I subsequently spoke with Dr. Reza, who will be admitting him.  A repeat lactic acid level is pending.    Diagnosis:    ICD-10-CM    1. Pneumonia of left lower lobe due to infectious organism  J18.9     2. Pleural effusion  J90    3. Sepsis, due to unspecified organism, unspecified whether acute organ dysfunction present (H)  A41.9    4. Alcohol intoxication, uncomplicated (H)  F10.920           1/8/2022   Kartik Lowery MD Lashkowitz, Seth H, MD  01/09/22 0018

## 2022-01-09 NOTE — PROGRESS NOTES
Pt is Croatian speaker. Pt is A&Ox4. VSS on RA Except temp of 101.1. Weaned from 2L to RA, sating 94-96%. Pt denies SOB, N/V. C/o of small pain to his R side when taking a deep breath PRN tylenol given with effect. Up independently. Tolerating regular diet. K: 3.2 and it was replaced, recheck pending. On IV azithromycin and rocephin. Discharge 1-2 days. Continue to monitor.

## 2022-01-09 NOTE — PROGRESS NOTES
RECEIVING UNIT ED HANDOFF REVIEW    ED Nurse Handoff Report was reviewed by: Duyen Degroot RN on January 9, 2022 at 12:15 AM

## 2022-01-09 NOTE — PROGRESS NOTES
PRIMARY DIAGNOSIS: PNEUMONIA  OUTPATIENT/OBSERVATION GOALS TO BE MET BEFORE DISCHARGE:  1. Dyspnea improved and O2 sats >88% on RA or back to baseline O2 levels: Yes   SpO2: 94 %, O2 Device: None (Room air)    2. Tolerating oral abx or appropriate plans made outpatient infusion: No. Continues on IV abx    3. Vitals signs normal or return to baseline: No    4. Short term supplemental O2 needed with activity at home: No    5. Tolerate oral intake to maintain hydration: Yes    6. Return to near baseline physical activity: No    Discharge Planner Nurse   Safe discharge environment identified: Yes  Barriers to discharge: Yes       Entered by: Seth Godinez 01/09/2022 9:38 AM     Please review provider order for any additional goals.   Nurse to notify provider when observation goals have been met and patient is ready for discharge.

## 2022-01-09 NOTE — H&P
[facility]    History and Physical - Hospitalist Service       Date of Admission:  1/8/2022    Assessment & Plan      Trevor Johnson is a 36 year old male admitted on 1/8/2022. He presents to the emergency department for evaluation of left posterior pleuritic chest pain, fevers, cough, and is found to have left lower lobe and lingular pneumonia with associated hypoxia and sepsis.    Community-acquired pneumonia:  Left-sided pleuritis: Parapneumonic pleurisy  Sepsis: Secondary to community-acquired pneumonia as above  Acute hypoxic respiratory failure: Patient with hypoxia, fevers, tachycardia, leukocytosis, lactic acid greater than 2, pleuritic chest discomfort and nonproductive cough.  COVID-19 and influenza negative.  CT chest imaging notable for left lower lobe and lingula infiltrate most consistent with bacterial pneumonia.  No prior history of pneumonia, no sick contacts.  Reports that he received his childhood vaccinations in [place].  No history of sexually transmitted illness.  Though hypoxic on room air, corrected with 2 L nasal cannula oxygen.  Some degree of hypoxia might have been secondary to pleuritic chest pain and splinting  -Trend fever curve and ensure improving  -Wean oxygen as able  -Switching from Zosyn administered in the emergency department to ceftriaxone and azithromycin for community-acquired pneumonia  -Incentive spirometry, pulmonary toilet.  Discussed with patient on admission  -Acetaminophen, ibuprofen for pain control primarily.  Anticipate most benefit from anti-inflammatory with ibuprofen.  Also has oxycodone and IV Dilaudid available if needed for breakthrough pain  -If fever curve fails to improve, consider thoracic surgery consultation in the setting of small left pleural effusion to rule out empyema.  At this juncture, patient reports he has only had 2 days of symptoms, so this seems less likely/necessary.    Lymphocytosis: Patient's presentation  is most consistent with acute infectious process, though CBC with lymphocytosis and smudge cells noted on differential.  Lymphocytic leukemia or lymphoma could potentially present similarly, though patient's age and history suggests against this diagnosis.  Note that infectious processes can be more common in the setting of leukemias and lymphomas, so diagnosis of pneumonia does not necessarily rule this out.  -Trend CBC; if lymphocytosis/smudge cells persists despite treatment of infectious process, would recommend peripheral smear evaluation  -Follow-up CT chest imaging in approximately 6 weeks to ensure resolution of left lower lobe finding with completion of anti-infectives    Psoriasis: Patient with multiple skin lesions as well as prior hypopigmentation from scarring on distal greater than proximal upper and lower extremities.  Follows with a clinic in [place] for this and is treated with topical creams  -Resume prior to admission topical steroid when reconciled by pharmacy    Type 2 diabetes: Patient tells me that he is on oral agents at home for this, he believes he is on Metformin.  -Medium dose sliding scale insulin available as needed for hyper glycemia  -For now, holding prior to admission Metformin given initially elevated lactic acid.  Anticipate resumption on discharge and following pharmacy reconciliation    Hypokalemia: Mild at 3.2  -Potassium replacement protocol in place       Diet:  Regular adult diet as tolerated  DVT Prophylaxis: Pneumatic compression devices  Hager Catheter: Not present  Central Lines: None  Code Status:  Full code.  Confirmed with patient on admission    Clinically Significant Risk Factors Present on Admission                # Obesity: last Body mass index is 33.13 kg/m .      Disposition Plan   Expected Discharge:  anticipated discharge 1/10/2022 versus 1/11/2022 pending improvement in fever curve as well as more time limiting correction of hypoxia with pain control,  pulmonary toilet, and treatment of community-acquired pneumonia with associated sepsis  Anticipated discharge location:  Awaiting care coordination huddle  Delays:           The patient's care was discussed with the Patient and Dr. Cho in the emergency department, bedside nursing staff.    Abner Reza MD  [facility]  Securely message with the Vocera Web Console (learn more here)  Text page via [facility] Paging/Directory        ______________________________________________________________________    Chief Complaint   Shortness of breath, fevers, pleuritic chest discomfort    History is obtained from patient, chart review, discussion with ER provider.  A professional telephone  was utilized initially during interview.  Later, bedside nurse also assisted with additional history as this was found to be more helpful by patient and staff.    History of Present Illness   Trevor Johnson is a 36 year old male who presents to the emergency department for evaluation of left-sided pleuritic chest discomfort, fevers, cough.  He is found to be hypoxic, and on CT imaging, with left lingular and lower lobe pneumonia as well as a small left-sided pleural effusion.    Patient immigrated from [place] approximately 6 years ago.  Received his childhood vaccinations in [place].  He works in a concrete cleaning business currently.    Patient has a history of psoriasis for which he follows in clinic in [place].  Uses topical creams for this.  He reports no significant change in his psoriasis recently.    He tells me that he has no prior history of pneumonia, no history of sexually transmitted infections.  No known allergies.    He has type 2 diabetes for which he is on oral medications, he believes he is on Metformin.  He does not take insulin.    For the past 2 days, patient has had fevers, nonproductive cough, and left chest discomfort with deep inspiration.  This pain is largely at the  base and lateral in reference to his chest wall.  He has been taking acetaminophen intermittently at home, and tells me that this is helped with both the fever and his discomfort.    Presented to the emergency department because this pain was unbearable, 7-10/10.  Received pain medications in the emergency department and tells me that he is very much improved.  He still has some mild discomfort with deep inspiration, though is much better in terms of discomfort.    Patient tells me that he has been eating and drinking normally, staying hydrated in the past several days.  No nausea, no vomiting, no diarrhea     No sputum production with cough    Review of Systems    The 10 point Review of Systems is negative other than noted in the HPI or here.  No nausea, vomiting, or diarrhea  Reports poor sleep over the past 2 days secondary to chest discomfort and fevers    Past Medical History    I have reviewed this patient's medical history and updated it with pertinent information if needed.   Psoriasis  Type 2 diabetes    Past Surgical History   I have reviewed this patient's surgical history and updated it with pertinent information if needed.  Patient reports no prior surgeries    Social History   I have reviewed this patient's social history and updated it with pertinent information if needed.  Social History     Tobacco Use     Smoking status:  Never smoker     Smokeless tobacco: No   Substance Use Topics     Alcohol use:  Yes.  No history of alcohol withdrawal.  Tells me that he will go through a 12 pack of beers in approximately 2 weeks     Drug use:  Reports no illicit substance use       Family History     Both mother and father are alive, have diabetes  No family history of stroke or heart attack that he is aware of    Prior to Admission Medications   Believes that he is on Metformin for his diabetes as well as a topical cream for his psoriasis     Allergies   No Known Allergies    Physical Exam   Vital Signs: Temp:  99.3  F (37.4  C) Temp src: Oral BP: 105/64 Pulse: 117   Resp: 15 SpO2: 97 % O2 Device: Nasal cannula Oxygen Delivery: 2 LPM  Weight: 187 lbs 0 oz    General Appearance: Generally robust appearing, though overweight, [place] American male laying on hospital bed.  He is mildly diaphoretic, does appear sick currently, though not toxic  Eyes: Mild periorbital edema bilaterally.  No scleral icterus or injection  HEENT: Atraumatic, normocephalic other than some periorbital edema  Respiratory: Crackles in the left base consistent with CT findings.  Mild tachypnea.  No significant splinting at this time with deep inspiration, though pain medications administered in the emergency department were quite palliative for him.  Cardiovascular: Tachycardia to the 110 range.  2/6 systolic murmur appreciated at apex  GI: Abdomen obese, soft, nontender to palpation.  No palpable mass.  Lymph/Hematologic: No lower extremity edema  Genitourinary: Not examined  Skin: Areas of circumferential hypopigmentation from prior scarring, active areas of psoriasis distally greater than proximally on both legs and arms.  Areas measure approximately 1 cm diameter  Musculoskeletal: Muscular tone and bulk intact in all extremities and appropriate for age  Neurologic: Alert, conversant, appropriate conversation.  Mental status is grossly intact  Psychiatric: Very pleasant, normal affect    Data   Data reviewed today: I reviewed all medications, new labs and imaging results over the last 24 hours. I personally reviewed the chest CT image(s) showing Left-sided lobar pneumonia.    Recent Labs   Lab 01/08/22  2230   WBC 18.1*   HGB 14.4   MCV 95         POTASSIUM 3.2*   CHLORIDE 105   CO2 24   BUN 15   CR 1.11   ANIONGAP 10   JOVI 8.8   *   ALBUMIN 3.5   PROTTOTAL 8.6   BILITOTAL 0.3   ALKPHOS 134   ALT 61   AST 26   LIPASE 84

## 2022-01-10 LAB
ERYTHROCYTE [DISTWIDTH] IN BLOOD BY AUTOMATED COUNT: 11.3 % (ref 10–15)
HCT VFR BLD AUTO: 37.2 % (ref 40–53)
HGB BLD-MCNC: 12.6 G/DL (ref 13.3–17.7)
LACTATE SERPL-SCNC: 0.9 MMOL/L (ref 0.7–2)
MCH RBC QN AUTO: 32 PG (ref 26.5–33)
MCHC RBC AUTO-ENTMCNC: 33.9 G/DL (ref 31.5–36.5)
MCV RBC AUTO: 94 FL (ref 78–100)
PLATELET # BLD AUTO: 203 10E3/UL (ref 150–450)
RBC # BLD AUTO: 3.94 10E6/UL (ref 4.4–5.9)
TROPONIN I SERPL HS-MCNC: 19 NG/L
TROPONIN I SERPL HS-MCNC: 20 NG/L
WBC # BLD AUTO: 11 10E3/UL (ref 4–11)

## 2022-01-10 PROCEDURE — 83605 ASSAY OF LACTIC ACID: CPT | Performed by: INTERNAL MEDICINE

## 2022-01-10 PROCEDURE — 36415 COLL VENOUS BLD VENIPUNCTURE: CPT | Performed by: INTERNAL MEDICINE

## 2022-01-10 PROCEDURE — 250N000011 HC RX IP 250 OP 636: Performed by: INTERNAL MEDICINE

## 2022-01-10 PROCEDURE — 250N000013 HC RX MED GY IP 250 OP 250 PS 637: Performed by: INTERNAL MEDICINE

## 2022-01-10 PROCEDURE — 36415 COLL VENOUS BLD VENIPUNCTURE: CPT | Performed by: NURSE PRACTITIONER

## 2022-01-10 PROCEDURE — 250N000011 HC RX IP 250 OP 636: Performed by: NURSE PRACTITIONER

## 2022-01-10 PROCEDURE — 99233 SBSQ HOSP IP/OBS HIGH 50: CPT | Performed by: INTERNAL MEDICINE

## 2022-01-10 PROCEDURE — 85027 COMPLETE CBC AUTOMATED: CPT | Performed by: INTERNAL MEDICINE

## 2022-01-10 PROCEDURE — 258N000003 HC RX IP 258 OP 636: Performed by: INTERNAL MEDICINE

## 2022-01-10 PROCEDURE — 258N000003 HC RX IP 258 OP 636: Performed by: NURSE PRACTITIONER

## 2022-01-10 PROCEDURE — 84484 ASSAY OF TROPONIN QUANT: CPT | Performed by: NURSE PRACTITIONER

## 2022-01-10 PROCEDURE — 120N000004 HC R&B MS OVERFLOW

## 2022-01-10 RX ORDER — KETOROLAC TROMETHAMINE 30 MG/ML
30 INJECTION, SOLUTION INTRAMUSCULAR; INTRAVENOUS ONCE
Status: COMPLETED | OUTPATIENT
Start: 2022-01-10 | End: 2022-01-10

## 2022-01-10 RX ADMIN — PIPERACILLIN SODIUM AND TAZOBACTAM SODIUM 3.38 G: 3; .375 INJECTION, POWDER, LYOPHILIZED, FOR SOLUTION INTRAVENOUS at 05:02

## 2022-01-10 RX ADMIN — OXYCODONE HYDROCHLORIDE 5 MG: 5 TABLET ORAL at 06:32

## 2022-01-10 RX ADMIN — OXYCODONE HYDROCHLORIDE 5 MG: 5 TABLET ORAL at 11:26

## 2022-01-10 RX ADMIN — AZITHROMYCIN MONOHYDRATE 250 MG: 500 INJECTION, POWDER, LYOPHILIZED, FOR SOLUTION INTRAVENOUS at 00:49

## 2022-01-10 RX ADMIN — ACETAMINOPHEN 650 MG: 325 TABLET, FILM COATED ORAL at 00:49

## 2022-01-10 RX ADMIN — PIPERACILLIN SODIUM AND TAZOBACTAM SODIUM 3.38 G: 3; .375 INJECTION, POWDER, LYOPHILIZED, FOR SOLUTION INTRAVENOUS at 11:25

## 2022-01-10 RX ADMIN — HYDROMORPHONE HYDROCHLORIDE 0.5 MG: 1 INJECTION, SOLUTION INTRAMUSCULAR; INTRAVENOUS; SUBCUTANEOUS at 10:26

## 2022-01-10 RX ADMIN — HYDROMORPHONE HYDROCHLORIDE 0.5 MG: 1 INJECTION, SOLUTION INTRAMUSCULAR; INTRAVENOUS; SUBCUTANEOUS at 17:00

## 2022-01-10 RX ADMIN — ENOXAPARIN SODIUM 40 MG: 40 INJECTION SUBCUTANEOUS at 14:43

## 2022-01-10 RX ADMIN — PIPERACILLIN SODIUM AND TAZOBACTAM SODIUM 3.38 G: 3; .375 INJECTION, POWDER, LYOPHILIZED, FOR SOLUTION INTRAVENOUS at 22:43

## 2022-01-10 RX ADMIN — SODIUM CHLORIDE: 9 INJECTION, SOLUTION INTRAVENOUS at 17:00

## 2022-01-10 RX ADMIN — SODIUM CHLORIDE: 9 INJECTION, SOLUTION INTRAVENOUS at 06:32

## 2022-01-10 RX ADMIN — OXYCODONE HYDROCHLORIDE 5 MG: 5 TABLET ORAL at 22:34

## 2022-01-10 RX ADMIN — ACETAMINOPHEN 650 MG: 325 TABLET, FILM COATED ORAL at 08:32

## 2022-01-10 RX ADMIN — PIPERACILLIN SODIUM AND TAZOBACTAM SODIUM 3.38 G: 3; .375 INJECTION, POWDER, LYOPHILIZED, FOR SOLUTION INTRAVENOUS at 17:02

## 2022-01-10 RX ADMIN — ACETAMINOPHEN 650 MG: 325 TABLET, FILM COATED ORAL at 20:51

## 2022-01-10 RX ADMIN — OXYCODONE HYDROCHLORIDE 5 MG: 5 TABLET ORAL at 00:01

## 2022-01-10 RX ADMIN — HYDROMORPHONE HYDROCHLORIDE 0.5 MG: 1 INJECTION, SOLUTION INTRAMUSCULAR; INTRAVENOUS; SUBCUTANEOUS at 06:57

## 2022-01-10 RX ADMIN — KETOROLAC TROMETHAMINE 30 MG: 30 INJECTION, SOLUTION INTRAMUSCULAR; INTRAVENOUS at 14:43

## 2022-01-10 RX ADMIN — HYDROMORPHONE HYDROCHLORIDE 0.5 MG: 1 INJECTION, SOLUTION INTRAMUSCULAR; INTRAVENOUS; SUBCUTANEOUS at 13:16

## 2022-01-10 ASSESSMENT — ACTIVITIES OF DAILY LIVING (ADL)
ADLS_ACUITY_SCORE: 5
ADLS_ACUITY_SCORE: 3
ADLS_ACUITY_SCORE: 5
ADLS_ACUITY_SCORE: 5
ADLS_ACUITY_SCORE: 3
ADLS_ACUITY_SCORE: 5
ADLS_ACUITY_SCORE: 3
ADLS_ACUITY_SCORE: 5
ADLS_ACUITY_SCORE: 3
ADLS_ACUITY_SCORE: 5
ADLS_ACUITY_SCORE: 3
ADLS_ACUITY_SCORE: 5
ADLS_ACUITY_SCORE: 5

## 2022-01-10 ASSESSMENT — MIFFLIN-ST. JEOR: SCORE: 1667.46

## 2022-01-10 NOTE — PROGRESS NOTES
[facility]    Hospitalist Progress Note    Assessment & Plan   Trevor Johnson is a 36 year old male admitted on 1/8/2022. He presents to the emergency department for evaluation of left posterior pleuritic chest pain, fevers, cough, and is found to have left lower lobe and lingular pneumonia with associated hypoxia and sepsis.     Community-acquired pneumonia:  Small left pleural effusion-parapneumonic  Left-sided pleuritis: Parapneumonic pleurisy  Sepsis: Secondary to community-acquired pneumonia as above  Acute hypoxic respiratory failure: Patient with hypoxia, fevers, tachycardia, leukocytosis, lactic acid greater than 2, pleuritic chest discomfort and nonproductive cough.  COVID-19 and influenza negative.  CT chest imaging notable for left lower lobe and lingula infiltrate most consistent with bacterial pneumonia.  No prior history of pneumonia, no sick contacts.  Reports that he received his childhood vaccinations in [place].  No history of sexually transmitted illness.  Though hypoxic on room air, corrected with 2 L nasal cannula oxygen.  Some degree of hypoxia might have been secondary to pleuritic chest pain and splinting  -Trend fever curve and ensure improving, patient is currently in room air  -Patient was switched to Zosyn and Zithromax oral 1/9 due to increased pain.  White count is normalized.  -Encourage I-S use  -Acetaminophen, ibuprofen for pain control primarily.  We will try a dose of IV Toradol since creatinine is stable, patient continues to have significant left-sided chest pain.  Repeat CT was obtained on 1/9 due to pain and had noted worsening of pneumonia.  Antibiotics changed since then.  Hyponatremia  --Continue IV fluids, appears to be hypovolemic  Lymphocytosis: Patient's presentation is most consistent with acute infectious process, though CBC with lymphocytosis and smudge cells noted on differential.  Lymphocytic leukemia or lymphoma could potentially  present similarly, though patient's age and history suggests against this diagnosis.  Note that infectious processes can be more common in the setting of leukemias and lymphomas, so diagnosis of pneumonia does not necessarily rule this out.  -White count has normalized  -Follow-up CT chest imaging in approximately 6 weeks to ensure resolution of left lower lobe finding with completion of anti-infectives     Psoriasis: Patient with multiple skin lesions as well as prior hypopigmentation from scarring on distal greater than proximal upper and lower extremities.  Follows with a clinic in [place] for this and is treated with topical creams  -Resume prior to admission topical steroid      Type 2 diabetes: Patient tells me that he is on oral agents at home for this, he believes he is on Metformin.  -Medium dose sliding scale insulin available as needed for hyper glycemia  -We will continue Metformin on discharge     Hypokalemia: Mild at 3.2  -Potassium replacement protocol in place    DVT Prophylaxis: Lovenox  Code Status: Full Code     Disposition: Expected discharge possibly 1/11/2022 pain is controlled    Iesha Kingston MD  Text Page   (7am to 6pm)    Interval History   Patient continues to have significant left-sided chest pain, mostly on lateral aspect, he is currently resting in room air, appears weak    -Data reviewed today: I reviewed all new labs and imaging results over the last 24 hours.   Physical Exam   Temp: 99.9  F (37.7  C) Temp src: Oral BP: (!) 142/99 Pulse: 114   Resp: 18 SpO2: 91 % O2 Device: None (Room air) Oxygen Delivery: 6 LPM  Vitals:    01/08/22 2247 01/09/22 0808 01/10/22 0616   Weight: 84.8 kg (187 lb) 86.1 kg (189 lb 12.8 oz) 84.2 kg (185 lb 11.2 oz)     Vital Signs with Ranges  Temp:  [97.8  F (36.6  C)-101.9  F (38.8  C)] 99.9  F (37.7  C)  Pulse:  [] 114  Resp:  [18-30] 18  BP: (111-151)/() 142/99  SpO2:  [91 %-96 %] 91 %  I/O last 3 completed shifts:  In: 720 [P.O.:720]  Out:  600 [Urine:600]    Constitutional: Awake, alert, cooperative, no apparent distress  Respiratory:crackles present on left lower lobe.  Cardiovascular: Regular rate and rhythm, normal S1 and S2, and no murmur noted  GI: Normal bowel sounds, soft, non-distended, non-tender  Skin/Integumen: No rashes, no cyanosis, no edema  Neuro : moving all 4 extremities, no focal deficit noted     Medications     sodium chloride 100 mL/hr at 01/10/22 0632       azithromycin  250 mg Intravenous Q24H     piperacillin-tazobactam  3.375 g Intravenous Q6H     sodium chloride (PF)  3 mL Intracatheter Q8H       Data   Recent Labs   Lab 01/10/22  0320 01/09/22  1649 01/09/22  0854 01/09/22  0706 01/08/22  2230   WBC 11.0 12.7* 10.5  --  18.1*   HGB 12.6* 12.5* 13.2*  --  14.4   MCV 94 95 92  --  95    238 231  --  332   NA  --  130* 138  --  139   POTASSIUM  --  3.7 3.9 3.9 3.2*   CHLORIDE  --  102 111*  --  105   CO2  --  22 21  --  24   BUN  --  13 13  --  15   CR  --  0.76 0.68  --  1.11   ANIONGAP  --  6 6  --  10   JOVI  --  8.4* 8.5  --  8.8   GLC  --  217* 147*  --  154*   ALBUMIN  --  3.0* 3.0*  --  3.5   PROTTOTAL  --  7.7 7.8  --  8.6   BILITOTAL  --  0.8 0.4  --  0.3   ALKPHOS  --  122 127  --  134   ALT  --  46 53  --  61   AST  --  17 22  --  26   LIPASE  --  59*  --   --  84     Recent Labs   Lab 01/09/22  1649 01/09/22  0854 01/08/22  2230   * 147* 154*       Imaging:   Recent Results (from the past 24 hour(s))   CT Chest/Abdomen/Pelvis w Contrast    Narrative    EXAM: CT CHEST/ABDOMEN/PELVIS W CONTRAST  LOCATION: [facility]  DATE/TIME: 1/9/2022 4:24 PM    INDICATION: CTA chest and CT abdomen and pelvis 01/08/2022.  COMPARISON: None.  TECHNIQUE: CT scan of the chest, abdomen, and pelvis was performed following injection of IV contrast. Multiplanar reformats were obtained. Dose reduction techniques were used.   CONTRAST: 95 mL [id number]    FINDINGS:   LUNGS AND PLEURA: Respiratory motion  artifact. Consolidation of the lingula and left lower lobe has worsened from yesterday. The small left pleural effusion has enlarged. Mild right basilar atelectasis. No pneumothorax.    MEDIASTINUM/AXILLAE: Small amount of fluid throughout the lumen of the esophagus suggests gastroesophageal reflux. Mildly enlarged left hilar and mediastinal lymph nodes.    CORONARY ARTERY CALCIFICATION: None.    HEPATOBILIARY: Hepatic steatosis. Normal gallbladder and bile ducts.    PANCREAS: Normal.    SPLEEN: Normal.    ADRENAL GLANDS: Normal.    KIDNEYS/BLADDER: Normal.    BOWEL: Normal. No obstruction or inflammation. Normal appendix. No free fluid or gas.    LYMPH NODES: Normal.    VASCULATURE: Unremarkable.    PELVIC ORGANS: Normal.    MUSCULOSKELETAL: Normal.      Impression    IMPRESSION:  1.  Pneumonia of the lingula and left lower lobe has worsened from yesterday.  2.  Small left pleural effusion has enlarged.  3.  Small amount of fluid in the lumen of the esophagus suggests gastroesophageal reflux.  4.  No acute process in the abdomen or pelvis.  5.  No urolithiasis or hydronephrosis.  6.  Hepatic steatosis.

## 2022-01-10 NOTE — PROGRESS NOTES
Guinean speaking,Jona utilized. Tmax 101.9, now 98.3, sinus Tachy,  slightly elevated BP,RA. RRT early on d/t uncontrolled left flank/ribcage pain,refer to RRT note. UA sent, refer to results. CT chest/abdomen/pelvis done, showing worsening  PNA, MD aware, continues on IV abx. Blood culture pending.SBA. Monitor.

## 2022-01-10 NOTE — PROGRESS NOTES
Pt is Tajik speaker. Pt is A&Ox4. VSS on RA. Pt denies SOB, N/V. C/o L sided ribcage pain managed with PRN oxy and Tylenol.Tele: NSR. Up SBA. Tolerating regular diet. On IV azithromycin and zosyn. CT chest/abdomen/pelvis showing worsening pheumonia from the first imaging.Discharge 1-2 days. Continue to monitor.

## 2022-01-10 NOTE — PROGRESS NOTES
[facility] RRT Followup Note    Pt is still somnolent, still has in nasal airway but has good respiratory rate and pattern, no snoring, HOB 45%.  On continuous pulse ox, and tele.  Wife at bedside.    When pt is more alert nasal airway may be removed, and if pt removes it himself it can stay out.

## 2022-01-11 ENCOUNTER — APPOINTMENT (OUTPATIENT)
Dept: INTERPRETER SERVICES | Facility: CLINIC | Age: 37
End: 2022-01-11
Payer: MEDICAID

## 2022-01-11 VITALS
WEIGHT: 185 LBS | SYSTOLIC BLOOD PRESSURE: 142 MMHG | BODY MASS INDEX: 32.78 KG/M2 | HEIGHT: 63 IN | HEART RATE: 102 BPM | OXYGEN SATURATION: 94 % | TEMPERATURE: 99 F | RESPIRATION RATE: 18 BRPM | DIASTOLIC BLOOD PRESSURE: 87 MMHG

## 2022-01-11 LAB
ANION GAP SERPL CALCULATED.3IONS-SCNC: 9 MMOL/L (ref 3–14)
ATRIAL RATE - MUSE: 133 BPM
BUN SERPL-MCNC: 7 MG/DL (ref 7–30)
CALCIUM SERPL-MCNC: 8.8 MG/DL (ref 8.5–10.1)
CHLORIDE BLD-SCNC: 105 MMOL/L (ref 94–109)
CO2 SERPL-SCNC: 22 MMOL/L (ref 20–32)
CREAT SERPL-MCNC: 0.66 MG/DL (ref 0.66–1.25)
DIASTOLIC BLOOD PRESSURE - MUSE: NORMAL MMHG
GFR SERPL CREATININE-BSD FRML MDRD: >90 ML/MIN/1.73M2
GLUCOSE BLD-MCNC: 144 MG/DL (ref 70–99)
INTERPRETATION ECG - MUSE: NORMAL
P AXIS - MUSE: 18 DEGREES
POTASSIUM BLD-SCNC: 3.4 MMOL/L (ref 3.4–5.3)
PR INTERVAL - MUSE: 142 MS
QRS DURATION - MUSE: 80 MS
QT - MUSE: 284 MS
QTC - MUSE: 422 MS
R AXIS - MUSE: 28 DEGREES
SODIUM SERPL-SCNC: 136 MMOL/L (ref 133–144)
SYSTOLIC BLOOD PRESSURE - MUSE: NORMAL MMHG
T AXIS - MUSE: 10 DEGREES
VENTRICULAR RATE- MUSE: 133 BPM

## 2022-01-11 PROCEDURE — 258N000003 HC RX IP 258 OP 636: Performed by: NURSE PRACTITIONER

## 2022-01-11 PROCEDURE — 80048 BASIC METABOLIC PNL TOTAL CA: CPT | Performed by: INTERNAL MEDICINE

## 2022-01-11 PROCEDURE — 250N000011 HC RX IP 250 OP 636: Performed by: NURSE PRACTITIONER

## 2022-01-11 PROCEDURE — 258N000003 HC RX IP 258 OP 636: Performed by: INTERNAL MEDICINE

## 2022-01-11 PROCEDURE — 250N000011 HC RX IP 250 OP 636: Performed by: INTERNAL MEDICINE

## 2022-01-11 PROCEDURE — 36415 COLL VENOUS BLD VENIPUNCTURE: CPT | Performed by: INTERNAL MEDICINE

## 2022-01-11 PROCEDURE — 99239 HOSP IP/OBS DSCHRG MGMT >30: CPT | Performed by: INTERNAL MEDICINE

## 2022-01-11 PROCEDURE — 250N000013 HC RX MED GY IP 250 OP 250 PS 637: Performed by: INTERNAL MEDICINE

## 2022-01-11 RX ORDER — AMOXICILLIN 250 MG
1 CAPSULE ORAL 2 TIMES DAILY PRN
COMMUNITY
Start: 2022-01-11

## 2022-01-11 RX ORDER — LORAZEPAM 2 MG/ML
2 INJECTION INTRAMUSCULAR ONCE
Status: DISCONTINUED | OUTPATIENT
Start: 2022-01-09 | End: 2022-01-11

## 2022-01-11 RX ORDER — PANTOPRAZOLE SODIUM 40 MG/1
40 TABLET, DELAYED RELEASE ORAL DAILY
Qty: 7 TABLET | Refills: 0 | Status: SHIPPED | OUTPATIENT
Start: 2022-01-11 | End: 2022-01-18

## 2022-01-11 RX ORDER — IBUPROFEN 600 MG/1
600 TABLET, FILM COATED ORAL EVERY 6 HOURS PRN
Qty: 12 TABLET | Refills: 0 | Status: SHIPPED | OUTPATIENT
Start: 2022-01-11 | End: 2022-01-14

## 2022-01-11 RX ORDER — OXYCODONE HYDROCHLORIDE 5 MG/1
5 TABLET ORAL EVERY 4 HOURS PRN
Qty: 10 TABLET | Refills: 0 | Status: SHIPPED | OUTPATIENT
Start: 2022-01-11

## 2022-01-11 RX ORDER — HYDROMORPHONE HYDROCHLORIDE 2 MG/ML
3 INJECTION, SOLUTION INTRAMUSCULAR; INTRAVENOUS; SUBCUTANEOUS ONCE
Status: DISCONTINUED | OUTPATIENT
Start: 2022-01-09 | End: 2022-01-11

## 2022-01-11 RX ORDER — ACETAMINOPHEN 325 MG/1
650 TABLET ORAL EVERY 6 HOURS PRN
COMMUNITY
Start: 2022-01-11

## 2022-01-11 RX ORDER — AZITHROMYCIN 250 MG/1
250 TABLET, FILM COATED ORAL DAILY
Qty: 3 TABLET | Refills: 0 | Status: SHIPPED | OUTPATIENT
Start: 2022-01-11 | End: 2022-01-14

## 2022-01-11 RX ADMIN — OXYCODONE HYDROCHLORIDE 5 MG: 5 TABLET ORAL at 08:47

## 2022-01-11 RX ADMIN — PIPERACILLIN SODIUM AND TAZOBACTAM SODIUM 3.38 G: 3; .375 INJECTION, POWDER, LYOPHILIZED, FOR SOLUTION INTRAVENOUS at 16:56

## 2022-01-11 RX ADMIN — ACETAMINOPHEN 650 MG: 325 TABLET, FILM COATED ORAL at 11:43

## 2022-01-11 RX ADMIN — SODIUM CHLORIDE: 9 INJECTION, SOLUTION INTRAVENOUS at 04:49

## 2022-01-11 RX ADMIN — OXYCODONE HYDROCHLORIDE 5 MG: 5 TABLET ORAL at 15:34

## 2022-01-11 RX ADMIN — PIPERACILLIN SODIUM AND TAZOBACTAM SODIUM 3.38 G: 3; .375 INJECTION, POWDER, LYOPHILIZED, FOR SOLUTION INTRAVENOUS at 11:43

## 2022-01-11 RX ADMIN — ACETAMINOPHEN 650 MG: 325 TABLET, FILM COATED ORAL at 04:46

## 2022-01-11 RX ADMIN — PIPERACILLIN SODIUM AND TAZOBACTAM SODIUM 3.38 G: 3; .375 INJECTION, POWDER, LYOPHILIZED, FOR SOLUTION INTRAVENOUS at 04:46

## 2022-01-11 RX ADMIN — AZITHROMYCIN MONOHYDRATE 250 MG: 500 INJECTION, POWDER, LYOPHILIZED, FOR SOLUTION INTRAVENOUS at 00:19

## 2022-01-11 ASSESSMENT — ACTIVITIES OF DAILY LIVING (ADL)
ADLS_ACUITY_SCORE: 3
DEPENDENT_IADLS:: INDEPENDENT
ADLS_ACUITY_SCORE: 3

## 2022-01-11 ASSESSMENT — MIFFLIN-ST. JEOR: SCORE: 1664.28

## 2022-01-11 NOTE — PLAN OF CARE
Inpatient. Icelandic speaking, able to understand english. Alert and oriented x4. VSS on room air ex tachycardic and htn d/t pain. Infusing  ml/hr. Tele is Sinus tachy. Independent. Regular diet. Tylenol, oxy and ice pack given for pain. Scheduled Zosyn and Zithromax given. Encouraged to use IS when awake, 1000 ml level and good in tolerance x10. Recommend follow up CT chest in 6 weeks. Plan to continue Metformin on discharge. Possible discharge today if pain is controlled.

## 2022-01-11 NOTE — PROGRESS NOTES
Afghan speaking,Jona utilized. Tmax 99.9, sinus Tachy, slightly elevated BP,RA. IV abx for PNA as ordered.Continues to c/o left flank/ribcage pain, oxycodone, dilaudid, tylenol given w/ some relief. MD ordered IV Toradol x1, given w/ some relief. Appetite is fair. SBA.

## 2022-01-11 NOTE — DISCHARGE SUMMARY
[facility]    Discharge Summary  Hospitalist    Date of Admission:  1/8/2022  Date of Discharge:  1/11/2022  Discharging Provider: Iesha Kingston MD    Discharge Diagnoses   Left lower lobe pneumonia  Sepsis  Alcohol intoxication  Pleural effusion    History of Present Illness   Review admission history and physical.    Hospital Course   Trevor Johnson was admitted on 1/8/2022.  The following problems were addressed during his hospitalization:    Active Problems:    Pleural effusion    Alcohol intoxication, uncomplicated (H)    Pneumonia of left lower lobe due to infectious organism    Sepsis, due to unspecified organism, unspecified whether acute organ dysfunction present (H)  Trevor Johnson is a 36 year old male admitted on 1/8/2022. He presents to the emergency department for evaluation of left posterior pleuritic chest pain, fevers, cough, and is found to have left lower lobe and lingular pneumonia with associated hypoxia and sepsis.     Community-acquired pneumonia:  Small left pleural effusion-parapneumonic  Left-sided pleuritis: Parapneumonic pleurisy  Sepsis: Secondary to community-acquired pneumonia as above  Acute hypoxic respiratory failure: Patient with hypoxia, fevers, tachycardia, leukocytosis, lactic acid greater than 2, pleuritic chest discomfort and nonproductive cough.  COVID-19 and influenza negative.  CT chest imaging notable for left lower lobe and lingula infiltrate most consistent with bacterial pneumonia.  No prior history of pneumonia, no sick contacts.  Reports that he received his childhood vaccinations in [place].  No history of sexually transmitted illness.  Though hypoxic on room air, corrected with 2 L nasal cannula oxygen.  Some degree of hypoxia might have been secondary to pleuritic chest pain and splinting. patient is currently in room air, afebrile  Patient was switched to Zosyn and Zithromax oral 1/9 due to increased pain.  White count is  normalized.  Encourage I-S use, patient had significant left-sided chest pain the day after admission, repeat CT was obtained on 1/9 due to pain and had noted worsening of pneumonia.  Antibiotics changed since then.  Patient will need repeat x-ray to evaluate resolution of symptoms, he was resting in room air and pain well controlled prior to discharge.  Hyponatremia  --Continue IV fluids, appears to be hypovolemic  Lymphocytosis: Patient's presentation is most consistent with acute infectious process, though CBC with lymphocytosis and smudge cells noted on differential.  Lymphocytic leukemia or lymphoma could potentially present similarly, though patient's age and history suggests against this diagnosis.  Note that infectious processes can be more common in the setting of leukemias and lymphomas, so diagnosis of pneumonia does not necessarily rule this out.  -White count has normalized  -Follow-up CT chest imaging in approximately 6 weeks to ensure resolution of left lower lobe finding with completion of anti-infectives     Psoriasis: Patient with multiple skin lesions as well as prior hypopigmentation from scarring on distal greater than proximal upper and lower extremities.  Follows with a clinic in [place] for this and is treated with topical creams  -Resume prior to admission topical steroid      Type 2 diabetes: Patient tells me that he is on oral agents at home for this, he believes he is on Metformin.  -Medium dose sliding scale insulin available as needed for hyper glycemia  -We will continue Metformin on discharge     Hypokalemia: Mild at 3.2  -Potassium replacement protocol in place      Iesha Kingston MD    Significant Results and Procedures       Pending Results   These results will be followed up by PCP  Unresulted Labs Ordered in the Past 30 Days of this Admission     Date and Time Order Name Status Description    1/9/2022  4:20 PM Blood Culture Arm, Right Preliminary     1/9/2022  4:20 PM Blood  Culture Arm, Left Preliminary     1/8/2022 10:48 PM Blood Culture Peripheral Blood Preliminary     1/8/2022 10:48 PM Blood Culture Peripheral Blood Preliminary           Code Status   Full Code       Primary Care Physician   Rocio Escobar    Physical Exam   Temp: 99.7  F (37.6  C) Temp src: Oral BP: (!) 135/96 Pulse: 115   Resp: 18 SpO2: 95 % O2 Device: None (Room air) Oxygen Delivery: 2 LPM  Vitals:    01/09/22 0808 01/10/22 0616 01/11/22 0700   Weight: 86.1 kg (189 lb 12.8 oz) 84.2 kg (185 lb 11.2 oz) 83.9 kg (185 lb)     Vital Signs with Ranges  Temp:  [98.3  F (36.8  C)-102.6  F (39.2  C)] 99.7  F (37.6  C)  Pulse:  [103-135] 115  Resp:  [18-24] 18  BP: (131-153)/() 135/96  SpO2:  [90 %-95 %] 95 %  I/O last 3 completed shifts:  In: 720 [P.O.:720]  Out: 750 [Urine:750]    The patient was examined on the day of discharge.    Discharge Disposition   Discharged to home  Condition at discharge: Stable    Consultations This Hospital Stay   CARE MANAGEMENT / SOCIAL WORK IP CONSULT    Time Spent on this Encounter   IIesha MD, personally saw the patient today and spent greater than 30 minutes discharging this patient.    Discharge Orders      Reason for your hospital stay    Left-sided pneumonia     Follow-up and recommended labs and tests     Follow up with primary care provider, Rocio Escobar, within 7 days for hospital follow- up.  The following labs/tests are recommended: Patient will need a repeat chest x-ray in 4weeks to evaluate the pneumonia, there is possibility of parapneumonic effusion and possible loculation.  Need to verify resolution.  CBC in 1 week with the differential.     Activity    Your activity upon discharge: activity as tolerated     Discharge Instructions    Abstain from drinking alcohol, please make sure you get a repeat chest x-ray in 4 weeks to evaluate resolution of the pneumonia.     Diet    Follow this diet upon discharge: Orders Placed This Encounter      Combination Diet  Regular Diet Adult     Discharge Medications   Current Discharge Medication List      START taking these medications    Details   !! acetaminophen (TYLENOL) 325 MG tablet Take 2 tablets (650 mg) by mouth every 6 hours as needed for mild pain or other (and adjunct with moderate or severe pain or per patient request)      amoxicillin-clavulanate (AUGMENTIN) 875-125 MG tablet Take 1 tablet by mouth 2 times daily for 10 days  Qty: 20 tablet, Refills: 0    Associated Diagnoses: Pneumonia of left lower lobe due to infectious organism      azithromycin (ZITHROMAX) 250 MG tablet Take 1 tablet (250 mg) by mouth daily for 3 days  Qty: 3 tablet, Refills: 0    Associated Diagnoses: Pneumonia of left lower lobe due to infectious organism      ibuprofen (ADVIL/MOTRIN) 600 MG tablet Take 1 tablet (600 mg) by mouth every 6 hours as needed for inflammatory pain  Qty: 12 tablet, Refills: 0    Associated Diagnoses: Pneumonia of left lower lobe due to infectious organism      oxyCODONE (ROXICODONE) 5 MG tablet Take 1 tablet (5 mg) by mouth every 4 hours as needed for moderate to severe pain  Qty: 10 tablet, Refills: 0    Associated Diagnoses: Pneumonia of left lower lobe due to infectious organism      senna-docusate (SENOKOT-S/PERICOLACE) 8.6-50 MG tablet Take 1 tablet by mouth 2 times daily as needed for constipation       !! - Potential duplicate medications found. Please discuss with provider.      CONTINUE these medications which have NOT CHANGED    Details   !! acetaminophen (TYLENOL) 325 MG tablet Take 325-650 mg by mouth every 6 hours as needed for mild pain      metFORMIN (GLUCOPHAGE) 1000 MG tablet Take 1,000 mg by mouth 2 times daily (with meals)      UNKNOWN TO PATIENT Apply topically 2 times daily To whole body except face       !! - Potential duplicate medications found. Please discuss with provider.        Allergies   No Known Allergies  Data   Most Recent 3 CBC's:Recent Labs   Lab Test 01/10/22  0320 01/09/22  0603  01/09/22  0854   WBC 11.0 12.7* 10.5   HGB 12.6* 12.5* 13.2*   MCV 94 95 92    238 231      Most Recent 3 BMP's:  Recent Labs   Lab Test 01/11/22  0659 01/09/22  1649 01/09/22  0854    130* 138   POTASSIUM 3.4 3.7 3.9   CHLORIDE 105 102 111*   CO2 22 22 21   BUN 7 13 13   CR 0.66 0.76 0.68   ANIONGAP 9 6 6   JOVI 8.8 8.4* 8.5   * 217* 147*     Most Recent 2 LFT's:  Recent Labs   Lab Test 01/09/22  1649 01/09/22  0854   AST 17 22   ALT 46 53   ALKPHOS 122 127   BILITOTAL 0.8 0.4     Most Recent INR's and Anticoagulation Dosing History:  Anticoagulation Dose History    There is no flowsheet data to display.       Most Recent 3 Troponin's:No lab results found.  Most Recent Cholesterol Panel:No lab results found.  Most Recent 6 Bacteria Isolates From Any Culture (See EPIC Reports for Culture Details):No lab results found.  Most Recent TSH, T4 and A1c Labs:No lab results found.  Results for orders placed or performed during the hospital encounter of 01/08/22   CT Chest (PE) Abdomen Pelvis w Contrast    Narrative    EXAM: CT CHEST PE ABDOMEN PELVIS W CONTRAST  LOCATION: [facility]  DATE/TIME: 1/8/2022 10:28 PM    INDICATION: Left flank pain. Shortness of breath. Hypoxia.  COMPARISON: None.  TECHNIQUE: CT chest pulmonary angiogram and routine CT abdomen pelvis with IV contrast. Arterial phase through the chest and venous phase through the abdomen and pelvis. Multiplanar reformats and MIP reconstructions were performed. Dose reduction   techniques were used.   CONTRAST: 91 mL Isovue-370.        FINDINGS:  ANGIOGRAM CHEST: Pulmonary arteries are normal caliber and negative for pulmonary emboli. Thoracic aorta is negative for dissection. The heart size is normal.     LUNGS AND PLEURA: There are infiltrates in the left lower lobe and posterior lingula. 2.6 cm nodule or nodular infiltrate in the left lower lobe laterally. Dependent atelectasis at the right lung base. Small left  pleural effusion. No pneumothorax.    MEDIASTINUM/AXILLAE: Enlarged left hilar lymph node measuring up to 2.0 cm. Mildly enlarged right mediastinal lymph node.    CORONARY ARTERY CALCIFICATION: None.    HEPATOBILIARY: Fatty infiltration of the liver.    PANCREAS: Normal.    SPLEEN: Normal.    ADRENAL GLANDS: Normal.    KIDNEYS/BLADDER: Normal.    BOWEL: Large amount of food debris in the stomach. No outlet obstruction is evident. No bowel obstruction or inflammation. The appendix is normal. No free intraperitoneal gas or fluid.    LYMPH NODES: Normal.    VASCULATURE: Unremarkable.    PELVIC ORGANS: Normal.    MUSCULOSKELETAL: Normal.      Impression    IMPRESSION:  1.  Left lower lobe and lingular pneumonia. There is a 2.6 cm ovoid nodule or nodular infiltrate in the left lower lobe. Follow-up recommended.  2.  Mildly enlarged mediastinal and left hilar lymph nodes.  3.  Small left pleural effusion.  4.  Fatty infiltration of the liver.  5.  Large amount of food debris in the stomach. No evidence of outlet obstruction.   CT Chest/Abdomen/Pelvis w Contrast    Narrative    EXAM: CT CHEST/ABDOMEN/PELVIS W CONTRAST  LOCATION: [facility]  DATE/TIME: 1/9/2022 4:24 PM    INDICATION: CTA chest and CT abdomen and pelvis 01/08/2022.  COMPARISON: None.  TECHNIQUE: CT scan of the chest, abdomen, and pelvis was performed following injection of IV contrast. Multiplanar reformats were obtained. Dose reduction techniques were used.   CONTRAST: 95 mL Isovue-370    FINDINGS:   LUNGS AND PLEURA: Respiratory motion artifact. Consolidation of the lingula and left lower lobe has worsened from yesterday. The small left pleural effusion has enlarged. Mild right basilar atelectasis. No pneumothorax.    MEDIASTINUM/AXILLAE: Small amount of fluid throughout the lumen of the esophagus suggests gastroesophageal reflux. Mildly enlarged left hilar and mediastinal lymph nodes.    CORONARY ARTERY CALCIFICATION:  None.    HEPATOBILIARY: Hepatic steatosis. Normal gallbladder and bile ducts.    PANCREAS: Normal.    SPLEEN: Normal.    ADRENAL GLANDS: Normal.    KIDNEYS/BLADDER: Normal.    BOWEL: Normal. No obstruction or inflammation. Normal appendix. No free fluid or gas.    LYMPH NODES: Normal.    VASCULATURE: Unremarkable.    PELVIC ORGANS: Normal.    MUSCULOSKELETAL: Normal.      Impression    IMPRESSION:  1.  Pneumonia of the lingula and left lower lobe has worsened from yesterday.  2.  Small left pleural effusion has enlarged.  3.  Small amount of fluid in the lumen of the esophagus suggests gastroesophageal reflux.  4.  No acute process in the abdomen or pelvis.  5.  No urolithiasis or hydronephrosis.  6.  Hepatic steatosis.

## 2022-01-11 NOTE — CONSULTS
Care Management Initial Consult    General Information  Assessment completed with: Patient,    Type of CM/SW Visit: Initial Assessment    Primary Care Provider verified and updated as needed: Yes   Readmission within the last 30 days: no previous admission in last 30 days      Reason for Consult: discharge planning,insurance concerns  Advance Care Planning: Advance Care Planning Reviewed: no concerns identified          Communication Assessment  Patient's communication style: spoken language (non-English)    Hearing Difficulty or Deaf: no   Wear Glasses or Blind: no    Cognitive  Cognitive/Neuro/Behavioral: WDL                      Living Environment:   People in home: alone     Current living Arrangements: apartment      Able to return to prior arrangements: yes       Family/Social Support:  Care provided by: self  Provides care for: no one  Marital Status: Single  Significant Other          Description of Support System: Supportive         Current Resources:   Patient receiving home care services: No     Community Resources:    Equipment currently used at home: none  Supplies currently used at home:      Employment/Financial:  Employment Status:          Financial Concerns: insurance, none   Referral to Financial Counselor: Yes       Lifestyle & Psychosocial Needs:  Social Determinants of Health     Tobacco Use: Not on file   Alcohol Use: Not on file   Financial Resource Strain: Not on file   Food Insecurity: Not on file   Transportation Needs: Not on file   Physical Activity: Not on file   Stress: Not on file   Social Connections: Not on file   Intimate Partner Violence: Not on file   Depression: Not on file   Housing Stability: Not on file       Functional Status:  Prior to admission patient needed assistance:   Dependent ADLs:: Independent  Dependent IADLs:: Independent       Mental Health Status:          Chemical Dependency Status:                Values/Beliefs:  Spiritual, Cultural Beliefs, Scientologist  Practices, Values that affect care: no               Additional Information:  Met with pt at the bedside. Trevor is Latvian-speaking and  was used via [employer]. Writer introduced self and role. Explained that there was no insurance information on file for Trevor. Trevor verified that he does not have insurance but will make payments for his hospital stay. Writer educated him about the  office and the possibility of obtaining MA. Trevor is agreeable. Email sent to . Pt is established pt at [facility] in [place]. He uses their pharmacy for his PTA medications. States that he is able to afford his medications. He reports that he has an appointment on Feb 7th which will include education and management plan for his new diabetes dx. He does not currently have diabetic supplies. Will continue to follow this patient for discharge needs.    Rosaura Jeff RN

## 2022-01-12 ENCOUNTER — PATIENT OUTREACH (OUTPATIENT)
Dept: CARE COORDINATION | Facility: CLINIC | Age: 37
End: 2022-01-12
Payer: MEDICAID

## 2022-01-12 DIAGNOSIS — Z71.89 OTHER SPECIFIED COUNSELING: ICD-10-CM

## 2022-01-12 LAB
BASOPHILS # BLD MANUAL: 0 10E3/UL (ref 0–0.2)
BASOPHILS NFR BLD MANUAL: 0 %
EOSINOPHIL # BLD MANUAL: 0.4 10E3/UL (ref 0–0.7)
EOSINOPHIL NFR BLD MANUAL: 2 %
LYMPHOCYTES # BLD MANUAL: 8.9 10E3/UL (ref 0.8–5.3)
LYMPHOCYTES NFR BLD MANUAL: 49 %
MONOCYTES # BLD MANUAL: 0.7 10E3/UL (ref 0–1.3)
MONOCYTES NFR BLD MANUAL: 4 %
NEUTROPHILS # BLD MANUAL: 8.1 10E3/UL (ref 1.6–8.3)
NEUTROPHILS NFR BLD MANUAL: 45 %
PATH REV: ABNORMAL
PLAT MORPH BLD: ABNORMAL
RBC MORPH BLD: ABNORMAL
SMUDGE CELLS BLD QL SMEAR: PRESENT
VARIANT LYMPHS BLD QL SMEAR: PRESENT

## 2022-01-12 NOTE — PLAN OF CARE
Pt is discharging home at this time w/ all pt's belongings. AVS,meds and education given using a dasha. Questions answered. Pt's Spouse to transport.

## 2022-01-12 NOTE — PROGRESS NOTES
Clinic Care Coordination Contact  [facility]/Voicemail       Clinical Data: Care Coordinator Outreach  Outreach attempted x 1.  Left message on patient's voicemail with call back information and requested return call.  Plan:  Care Coordinator will try to reach patient again in 1-2 business days.    Greg German  Community Health Worker  [facility]  Ph:[phone]

## 2022-01-13 NOTE — PROGRESS NOTES
Clinic Care Coordination Contact  [facility]/Voicemail       Clinical Data: Care Coordinator Outreach  Outreach attempted x 2.  Left message on patient's voicemail with call back information and requested return call.  Plan: Care Coordinator will do no further outreaches at this time.    Greg German  Community Health Worker  [facility]  Ph:[phone]

## 2022-01-14 LAB
BACTERIA BLD CULT: NO GROWTH
BACTERIA BLD CULT: NO GROWTH

## 2022-01-15 LAB
BACTERIA BLD CULT: NO GROWTH
BACTERIA BLD CULT: NO GROWTH

## 2022-03-09 ENCOUNTER — APPOINTMENT (OUTPATIENT)
Dept: INTERPRETER SERVICES | Facility: CLINIC | Age: 37
End: 2022-03-09
Payer: MEDICAID